# Patient Record
(demographics unavailable — no encounter records)

---

## 2020-08-10 NOTE — XMS REPORT
Continuity of Care Document

                             Created on: 08/10/2020



Farrukh Laguna

External Reference #: [id number]

: 2010

Sex: Male



Demographics





                          Address                   [address]

[place], [place]  [id number]

 

                          Home Phone                [phone] x

 

                          Preferred Language        Unknown

 

                          Marital Status            Unknown

 

                          Jewish Affiliation     Unknown

 

                          Race                      Unknown

 

                          Ethnic Group              Unknown





Author





                          Organization              Unknown

 

                          Address                   Unknown

 

                          Phone                     Unavailable



              



Allergies

      



             Active           Description           Code           Type         

  Severity   

                Reaction           Onset           Reported/Identified          

 

Relationship to Patient                 Clinical Status        

 

             Yes           No Known Allergies           [facility]           MED       

    N/A      

             N/A                        07/15/2019                              

   



                  



Medications

      



                Medication           Packaging           Start Date           St

op Date         

                    Route               Dosage              Sig        

 

                                      Montelukast Sodium 4 MG Oral Tablet Chewab

le                     

Tablet Chewable           2014                               Tablet Chewab

le      

                          30                                    CHEW AND SWALLOW

 1 TABLET DAILY AT BEDTIME         

         

 

                                                    Ventolin  (90 Base) M

CG/ACT Inhalation Aerosol Solution      

              Unit           2015                        Unit           8 

 

                                                    INHALE 2 PUFFS (60 SEC APART

) AS NEEDED FOR COUGH OR WHEEZE.

                  

 

                                ATROPINE SULFATE                                

         2016         

                                                                            Appl

y one drop to the right eye

every morning                  

 

                                Vyvanse 20 MG Oral Capsule                     U

D                  

07/15/2019           08/15/2019           ORAL            20MG                  

  

   Take one capsule in the am daily for ADHD                  

 

                                                    guanFACINE HCl ER 2 MG Oral 

Tablet Extended Release 24 Hour         

                UD              07/15/2019           08/15/2019           ORAL  

        

                          2MG                                   TAKE 1 TABLET EV

YISEL MORNING.                  

 

                                ARIPiprazole 2 MG Oral Tablet                   

  UD                  

2019           ORAL            2MG                   

   

  take one tab am daily                  

 

                                ARIPiprazole 2 MG Oral Tablet                   

  UD                  

09/10/2019           12/10/2019           ORAL            2MG                   

   

  take one tab am daily                  

 

                                ARIPiprazole 2 MG Oral Tablet                   

  UD                  

2019           ORAL            2MG                   

   

  take 1 tab am daily and 1/2 tab after school                  

 

                                ARIPiprazole(Abilify)                           

              2019    

                                Oral                                        Oral

, Daily, 0 

Refill(s)                  



                                  



Problems

      



             Date Dx Coded           Attending           Type           Code    

       

Diagnosis                               Diagnosed By        

 

             2016                        W            H26.042           An

terior 

subcapsular polar infantile and juvenile cataract, left eye                    

 

             2016                        W            H50.05           Alt

ernating 

esotropia                                        

 

             2016                        W            H53.032           St

rabismic 

amblyopia, left eye                              

 

             2016                        W            H26.042           An

terior 

subcapsular polar infantile and juvenile cataract, left eye                    

 

             2016                        W            H50.05           Alt

ernating 

esotropia                                        

 

             2016                        W            H53.032           St

rabismic 

amblyopia, left eye                              

 

             2017                        W            H52.03           Hyp

ermetropia, 

bilateral                                        

 

             2017                        W            H53.032           St

rabismic 

amblyopia, left eye                              

 

                2017           ALIN N.P., PATRICIA CRAVEN     

          V20.2     

                                                             

 

                2017           ALIN N.P., PATRICIA CRAVEN     

          V07.31    

                                                             

 

                2017           ALIN N.P., PATRICIA CRAVEN     

          493.00    

                                                             

 

             2018           [employer]            V20.2    

       Well 

child exam                                       

 

             2018                        W            H52.03           Hyp

ermetropia, 

bilateral                                        

 

             2018                        W            H53.032           St

rabismic 

amblyopia, left eye                              

 

             2018                        W            H52.03           Hyp

ermetropia, 

bilateral                                        

 

             2018                        W            H53.032           St

rabismic 

amblyopia, left eye                              

 

             2018                        W            H52.03           Hyp

ermetropia, 

bilateral                                        

 

             2018                        W            H53.032           St

rabismic 

amblyopia, left eye                              

 

             2018           [employer]            V20.2    

       Well 

child exam                                       

 

             2018           [employer]            312.9    

       

Unspecified conduct disorder                     

 

             2018           [employer]            298.9    

       

Affective mood disorder                          

 

             10/08/2018           [employer]            314.01   

        

Attention deficit hyperactivity disorder                    

 

             10/08/2018           [employer]            313.81   

        

Oppositional defiant disorder                    

 

             10/08/2018           [employer]            312.9    

       

Unspecified conduct disorder                     

 

             2018                        F            F91.3           Oppo

sitional 

defiant disorder                        Lata Aguila        

 

             2018                        F            F91.3           Oppo

sitional 

defiant disorder                        Lata Aguila        

 

             2018                        F            F91.3           Oppo

sitional 

defiant disorder                        Lindy Vega        

 

             2018                        F            F91.3           Oppo

sitional 

defiant disorder                        Anabel Sandrasulma Barcenas        

 

             2019                        F            F91.3           Oppo

sitional 

defiant disorder                        Lindy Vega        

 

             2019                        F            F91.3           Oppo

sitional 

defiant disorder                        Kusum, Kizzy        

 

             2019                        F            F91.3           Oppo

sitional 

defiant disorder                        Psy, Batch        

 

             2019                        F            F91.3           Oppo

sitional 

defiant disorder                        Vega, Lindy Sirisha        

 

             2019                        F            F91.3           Oppo

sitional 

defiant disorder                        Psy, Batch        

 

             2019                        F            F91.3           Oppo

sitional 

defiant disorder                        Psy, Batch        

 

             2019                        F            F91.3           Oppo

sitional 

defiant disorder                        Psy, Batch        

 

             2019                        F            F91.3           Oppo

sitional 

defiant disorder                        Psy, Batch        

 

             2019                        F            F91.3           Oppo

sitional 

defiant disorder                        Vega, Lindy Sirisha        

 

             2019                        F            F91.3           Oppo

sitional 

defiant disorder                        Psy, Batch        

 

             2019                        F            F91.3           Oppo

sitional 

defiant disorder                        Psy, Batch        

 

             2019                        F            F91.3           Oppo

sitional 

defiant disorder                        Psy, Batch        

 

             2019                        F            F91.3           Oppo

sitional 

defiant disorder                        Vega, Lindy Sirisha        

 

             2019                        F            F91.3           Oppo

sitional 

defiant disorder                        Psy, Batch        

 

             2019                        F            F91.3           Oppo

sitional 

defiant disorder                        Psy, Batch        

 

             2019                        F            F91.3           Oppo

sitional 

defiant disorder                        Psy, Batch        

 

             02/15/2019                        F            F91.3           Oppo

sitional 

defiant disorder                        Vega, Lindy Sirisha        

 

             02/15/2019                        F            F91.3           Oppo

sitional 

defiant disorder                        Psy, Batch        

 

             2019                        F            F91.3           Oppo

sitional 

defiant disorder                        Vega, Lindy Sirisha        

 

             2019                        F            F91.3           Oppo

sitional 

defiant disorder                        Vega, Lindy Sirisha        

 

             2019                        F            F91.3           Oppo

sitional 

defiant disorder                        Psy, Batch        

 

             2019                        F            F91.3           Oppo

sitional 

defiant disorder                        Psy, Batch        

 

             2019                        F            F91.3           Oppo

sitional 

defiant disorder                        Psy, Batch        

 

             2019                        F            F91.3           Oppo

sitional 

defiant disorder                        Vega, Lindy Sirisha        

 

             2019                        F            F91.3           Oppo

sitional 

defiant disorder                        Psy, Batch        

 

             2019                        F            F91.3           Oppo

sitional 

defiant disorder                        Psy, Batch        

 

             2019                        F            F91.3           Oppo

sitional 

defiant disorder                        Vega, Lindy Sirisha        

 

             2019                        F            F91.3           Oppo

sitional 

defiant disorder                        Psy, Batch        

 

             2019                        F            F91.3           Oppo

sitional 

defiant disorder                        Vega, Lindy Sirisha        

 

             2019                        F            F91.3           Oppo

sitional 

defiant disorder                        Psy, Batch        

 

             2019                        F            F91.3           Oppo

sitional 

defiant disorder                        Psy, Batch        

 

             2019                        F            F91.3           Oppo

sitional 

defiant disorder                        Psy, Batch        

 

             2019                        F            F91.3           Oppo

sitional 

defiant disorder                        Vega, Lindy Sirisha        

 

             03/15/2019                        F            F91.3           Oppo

sitional 

defiant disorder                        Psy, Batch        

 

             2019                        F            F91.3           Oppo

sitional 

defiant disorder                        Psy, Batch        

 

             2019                        F            F91.3           Oppo

sitional 

defiant disorder                        Psy, Batch        

 

             2019                        F            F91.3           Oppo

sitional 

defiant disorder                        Psy, Batch        

 

             2019                        F            F91.3           Oppo

sitional 

defiant disorder                        Psy, Batch        

 

             2019                        F            F91.3           Oppo

sitional 

defiant disorder                        Vega, Lindy Sirisha        

 

             2019                        F            F91.3           Oppo

sitional 

defiant disorder                        Psy, Batch        

 

             2019           [employer]., CHRISTIAN           F            F91

.9           

Conduct disorder, unspecified                    

 

             2019           [employer]A., CHRISTIAN           F            F90

.2           

Attention-deficit hyperactivity disorder, combined type                    

 

             2019                        F            F91.3           Oppo

sitional 

defiant disorder                        Psy, Batch        

 

             2019                        F            F91.3           Oppo

sitional 

defiant disorder                        Psy, Batch        

 

             2019                        F            F91.3           Oppo

sitional 

defiant disorder                        Vega, Lindy Sirisha        

 

             2019                        F            F91.3           Oppo

sitional 

defiant disorder                        Psy, Batch        

 

             2019                        F            F91.3           Oppo

sitional 

defiant disorder                        Psy, Batch        

 

             2019                        F            F91.3           Oppo

sitional 

defiant disorder                        Psy, Batch        

 

             2019                        F            F91.3           Oppo

sitional 

defiant disorder                        Vega, Lindy Sirisha        

 

             04/10/2019                        F            F91.3           Oppo

sitional 

defiant disorder                        Vega, Lindy Sirisha        

 

             04/10/2019                        F            F91.3           Oppo

sitional 

defiant disorder                        Psy, Batch        

 

             04/15/2019                        F            F91.3           Oppo

sitional 

defiant disorder                        Vega, Lindy Sirisha        

 

             2019                        F            F91.3           Oppo

sitional 

defiant disorder                        Psy, Batch        

 

             2019                        F            F91.3           Oppo

sitional 

defiant disorder                        Vega, Lindy Sirisha        

 

             2019                        F            F91.3           Oppo

sitional 

defiant disorder                        Psy, Batch        

 

             2019                        F            F91.3           Oppo

sitional 

defiant disorder                        Psy, Batch        

 

             2019                        F            F91.3           Oppo

sitional 

defiant disorder                        Psy, Batch        

 

             2019                        F            F91.3           Oppo

sitional 

defiant disorder                        Vega, Lindy Sirisha        

 

             2019                        F            F91.3           Oppo

sitional 

defiant disorder                        Psy, Batch        

 

             2019                        F            F91.3           Oppo

sitional 

defiant disorder                        Psy, Batch        

 

             2019                        F            F91.3           Oppo

sitional 

defiant disorder                        Psy, Batch        

 

             2019                        F            F91.3           Oppo

sitional 

defiant disorder                        Psy, Batch        

 

             2019                        F            F91.3           Oppo

sitional 

defiant disorder                        Vega, Lindy Sirisha        

 

             2019                        F            F91.3           Oppo

sitional 

defiant disorder                        Psy, Batch        

 

             2019                        F            F91.3           Oppo

sitional 

defiant disorder                        Psy, Batch        

 

             2019                        F            F91.3           Oppo

sitional 

defiant disorder                        Psy, Batch        

 

             2019                        F            F91.3           Oppo

sitional 

defiant disorder                        Vega, Lindy Sirisha        

 

             2019                        F            F91.3           Oppo

sitional 

defiant disorder                        Vega, Lindy Sirisha        

 

             2019                        F            F91.3           Oppo

sitional 

defiant disorder                        Psy, Batch        

 

             2019                        F            F91.3           Oppo

sitional 

defiant disorder                        Psy, Batch        

 

             2019                        F            F91.3           Oppo

sitional 

defiant disorder                        Vega, Lindy Sirisha        

 

             2019                        F            F91.3           Oppo

sitional 

defiant disorder                        Psy, Batch        

 

             2019                        F            F91.3           Oppo

sitional 

defiant disorder                        Psy, Batch        

 

             2019                        F            F91.3           Oppo

sitional 

defiant disorder                        Vega, Lindy Sirisha        

 

             05/15/2019                        F            F91.3           [facility]ppo

sitional 

defiant disorder                        Psy, Batch        

 

             05/15/2019                        F            F91.3           Oppo

sitional 

defiant disorder                        Psy, Batch        

 

             2019                        F            F91.3           Oppo

sitional 

defiant disorder                        Vega, Lindy Sirisha        

 

             2019                        F            F91.3           Oppo

sitional 

defiant disorder                        Psy, Batch        

 

             2019                        F            F91.3           Oppo

sitional 

defiant disorder                        Vega, Lindy Sirisha        

 

             2019                        F            F91.3           Oppo

sitional 

defiant disorder                        Vega, Lindy Sirisha        

 

             2019                        F            F91.3           Oppo

sitional 

defiant disorder                        Psy, Batch        

 

             2019                        F            F91.3           Oppo

sitional 

defiant disorder                        Psy, Batch        

 

             2019                        F            F91.3           Oppo

sitional 

defiant disorder                        Psy, Batch        

 

             2019                        F            F91.3           Oppo

sitional 

defiant disorder                        Psy, Batch        

 

             2019                        F            F91.3           Oppo

sitional 

defiant disorder                        Vega, Lindy Sirisha        

 

             2019                        F            F91.3           Oppo

sitional 

defiant disorder                        Psy, Batch        

 

             2019                        F            F91.3           Oppo

sitional 

defiant disorder                        Psy, Batch        

 

             2019                        F            F91.3           Oppo

sitional 

defiant disorder                        Psy, Batch        

 

             2019                        F            F91.3           Oppo

sitional 

defiant disorder                        Vega, Lindy Sirisha        

 

             2019                        F            F91.3           Oppo

sitional 

defiant disorder                        Psy, Batch        

 

             2019                        F            F91.3           Oppo

sitional 

defiant disorder                        Psy, Batch        

 

             2019                        F            F91.3           Oppo

sitional 

defiant disorder                        Psy, Batch        

 

             2019                        F            F91.3           Oppo

sitional 

defiant disorder                        Vega, Lindy Sirisha        

 

             2019                        F            F91.3           Oppo

sitional 

defiant disorder                        Psy, Batch        

 

             2019                        F            F91.3           Oppo

sitional 

defiant disorder                        Psy, Batch        

 

             2019                        F            F91.3           Oppo

sitional 

defiant disorder                        Psy, Batch        

 

             2019                        F            F91.3           Oppo

sitional 

defiant disorder                        Psy, Batch        

 

             2019                        F            F91.3           Oppo

sitional 

defiant disorder                        Vega, Lindy Sirisha        

 

             2019                        F            F91.3           Oppo

sitional 

defiant disorder                        Psy, Batch        

 

             2019                        F            F91.3           [facility]ppo

sitional 

defiant disorder                        Psy, Batch        

 

             2019                        F            F91.3           [facility]ppo

sitional 

defiant disorder                        Psy, Batch        

 

             2019                        F            F91.3           [facility]ppo

sitional 

defiant disorder                        Vega, Lindy Sirisha        

 

             2019                        F            F91.3           [facility]

sitional 

defiant disorder                        Psy, Batch        

 

             07/15/2019                        F            F90.1           Atte

ntion-deficit 

hyperactivity disorder, predominantly hyperactive type           Shira, 

Veronica        

 

             07/15/2019                        F            F91.3           [facility]

sitional 

defiant disorder                        Shira, Veronica        

 

             07/15/2019                        F            F91.3           [facility]

sitional 

defiant disorder                        Mckinley, Annel        

 

             07/15/2019                        F            F91.3           [facility]

sitional 

defiant disorder                        Psy, Batch        

 

             2019                        F            F90.1           Atte

ntion-deficit 

hyperactivity disorder, predominantly hyperactive type           Vega, Lindy

Sirisha        

 

             2019                        F            F91.3           [facility]

sitional 

defiant disorder                        Vega, Lindy Sirisha        

 

             2019                        F            F90.1           Atte

ntion-deficit 

hyperactivity disorder, predominantly hyperactive type           Vega, Lindy

Sirisha        

 

             2019                        F            F91.3           [facility]

sitional 

defiant disorder                        Vega, Lindy Sirisha        

 

             2019                        F            F91.3           [facility]

sitional 

defiant disorder                        Psy, Batch        

 

             2019                        F            F91.3           [facility]

sitional 

defiant disorder                        Psy, Batch        

 

             2019                        F            F90.1           Atte

ntion-deficit 

hyperactivity disorder, predominantly hyperactive type           Psy, Batch     

  

 

             2019                        F            F91.3           [facility]

sitional 

defiant disorder                        Psy, Batch        

 

             2019                        F            F90.1           Atte

ntion-deficit 

hyperactivity disorder, predominantly hyperactive type           Psy, Batch     

  

 

             2019                        F            F91.3           [facility]

sitional 

defiant disorder                        Psy, Batch        

 

             2019                        F            F90.1           Atte

ntion-deficit 

hyperactivity disorder, predominantly hyperactive type           Vega, Lindy

Sirisha        

 

             2019                        F            F91.3           [facility]

sitional 

defiant disorder                        Vega, Lindy Sirisha        

 

             2019                        F            F90.1           Atte

ntion-deficit 

hyperactivity disorder, predominantly hyperactive type           Psy, Batch     

  

 

             2019                        F            F91.3           [facility]

sitional 

defiant disorder                        Psy, Batch        

 

             2019                        F            F90.1           Atte

ntion-deficit 

hyperactivity disorder, predominantly hyperactive type           Psy, Batch     

  

 

             2019                        F            F91.3           [facility]

sitional 

defiant disorder                        Psy, Batch        

 

             2019                        F            F90.1           Atte

ntion-deficit 

hyperactivity disorder, predominantly hyperactive type           Vega, Lindy

Sirisha        

 

             2019                        F            F91.3           [facility]

sitional 

defiant disorder                        Vega, Lindy Sirisha        

 

             2019                        F            F90.1           Atte

ntion-deficit 

hyperactivity disorder, predominantly hyperactive type           Psy, Batch     

  

 

             2019                        F            F91.3           [facility]

sitional 

defiant disorder                        Psy, Batch        

 

             2019                        F            F90.1           Atte

ntion-deficit 

hyperactivity disorder, predominantly hyperactive type           Vega, Lindy

Sirisha        

 

             2019                        F            F91.3           [facility]

sitional 

defiant disorder                        Vega, Lindy Sirisha        

 

             2019                        F            F90.1           Atte

ntion-deficit 

hyperactivity disorder, predominantly hyperactive type           Psy, Batch     

  

 

             2019                        F            F91.3           [facility]

sitional 

defiant disorder                        Psy, Batch        

 

             2019                        F            F90.1           Atte

ntion-deficit 

hyperactivity disorder, predominantly hyperactive type           Psy, Batch     

  

 

             2019                        F            F91.3           [facility]

sitional 

defiant disorder                        Psy, Batch        

 

             2019                        F            F34.81           Dis

ruptive mood 

dysregulation disorder                  Shira, Veronica        

 

             2019                        F            F90.1           Atte

ntion-deficit 

hyperactivity disorder, predominantly hyperactive type           [place], 

Veronica        

 

             2019                        F            F91.3           [facility]

sitional 

defiant disorder                        Shira, Veronica        

 

             2019                        F            F91.3           [facility]

sitional 

defiant disorder                        Psy, Batch        

 

             2019                        F            F90.1           Atte

ntion-deficit 

hyperactivity disorder, predominantly hyperactive type           Psy, Batch     

  

 

             2019                        F            F91.3           [facility]

sitional 

defiant disorder                        Psy, Batch        

 

             2019                        F            F90.1           Atte

ntion-deficit 

hyperactivity disorder, predominantly hyperactive type           Psy, Batch     

  

 

             2019                        F            F91.3           [facility]

sitional 

defiant disorder                        Psy, Batch        

 

             2019                        F            F34.81           Dis

ruptive mood 

dysregulation disorder                  Psy, Batch        

 

             2019                        F            F90.1           Atte

ntion-deficit 

hyperactivity disorder, predominantly hyperactive type           Psy, Batch     

  

 

             2019                        F            F34.81           Dis

ruptive mood 

dysregulation disorder                  Psy, Batch        

 

             2019                        F            F90.1           Atte

ntion-deficit 

hyperactivity disorder, predominantly hyperactive type           Psy, Batch     

  

 

             2019                        F            F34.81           Dis

ruptive mood 

dysregulation disorder                  Psy, Batch        

 

             2019                        F            F90.1           Atte

ntion-deficit 

hyperactivity disorder, predominantly hyperactive type           Psy, Batch     

  

 

             2019                        F            F34.81           Dis

ruptive mood 

dysregulation disorder                  Psy, Batch        

 

             2019                        F            F90.1           Atte

ntion-deficit 

hyperactivity disorder, predominantly hyperactive type           Psy, Batch     

  

 

             2019                        F            F34.81           Dis

ruptive mood 

dysregulation disorder                  Psy, Batch        

 

             2019                        F            F90.1           Atte

ntion-deficit 

hyperactivity disorder, predominantly hyperactive type           Psy, Batch     

  

 

             09/10/2019                        F            F91.3           [facility]

sitional 

defiant disorder                        Shira, Veronica        

 

             09/10/2019                        F            F91.3           [facility]

sitional 

defiant disorder                        Psy, Batch        

 

             2019                        F            F34.81           Dis

ruptive mood 

dysregulation disorder                  Psy, Batch        

 

             2019                        F            F90.1           Atte

ntion-deficit 

hyperactivity disorder, predominantly hyperactive type           Psy, Batch     

  

 

             2019                        F            F34.81           Dis

ruptive mood 

dysregulation disorder                  Psy, Batch        

 

             2019                        F            F90.1           Atte

ntion-deficit 

hyperactivity disorder, predominantly hyperactive type           Psy, Batch     

  

 

             2019                        F            F34.81           Dis

ruptive mood 

dysregulation disorder                  McClish, Kaylyn M        

 

             2019                        F            F90.1           Atte

ntion-deficit 

hyperactivity disorder, predominantly hyperactive type           Kaylyn Griffith        

 

             2019                        F            F34.81           Dis

ruptive mood 

dysregulation disorder                  Psy, Batch        

 

             2019                        F            F90.1           Atte

ntion-deficit 

hyperactivity disorder, predominantly hyperactive type           Psy, Batch     

  

 

             2019                        F            F34.81           Dis

ruptive mood 

dysregulation disorder                  Kaylyn Griffith        

 

             2019                        F            F90.1           Atte

ntion-deficit 

hyperactivity disorder, predominantly hyperactive type           Kaylyn Griffith        

 

             2019                        F            F34.81           Dis

ruptive mood 

dysregulation disorder                  Psy, Batch        

 

             2019                        F            F90.1           Atte

ntion-deficit 

hyperactivity disorder, predominantly hyperactive type           Psy, Batch     

  

 

             2019                        F            F34.81           Dis

ruptive mood 

dysregulation disorder                  Psy, Batch        

 

             2019                        F            F90.1           Atte

ntion-deficit 

hyperactivity disorder, predominantly hyperactive type           Psy, Batch     

  

 

             2019                        F            F34.81           Dis

ruptive mood 

dysregulation disorder                  Psy, Batch        

 

             2019                        F            F90.1           Atte

ntion-deficit 

hyperactivity disorder, predominantly hyperactive type           Psy, Batch     

  

 

                2019           CHRISTIAN HANNAH            

   Z00.129          

                                        Encounter for routine child health exami

[facility] without abnormal findings        

                                                 

 

             2019                        F            F34.81           Dis

ruptive mood 

dysregulation disorder                  Kaylyn Griffith        

 

             2019                        F            F90.1           Atte

ntion-deficit 

hyperactivity disorder, predominantly hyperactive type           Kaylyn Griffith        

 

             2019                        F            F34.81           Dis

ruptive mood 

dysregulation disorder                  Psy, Batch        

 

             2019                        F            F90.1           Atte

ntion-deficit 

hyperactivity disorder, predominantly hyperactive type           Psy, Batch     

  

 

             10/02/2019                        F            F34.81           Dis

ruptive mood 

dysregulation disorder                  Psy, Batch        

 

             10/02/2019                        F            F90.1           Atte

ntion-deficit 

hyperactivity disorder, predominantly hyperactive type           Psy, Batch     

  

 

             10/02/2019                        F            F34.81           Dis

ruptive mood 

dysregulation disorder                  Psy, Batch        

 

             10/02/2019                        F            F90.1           Atte

ntion-deficit 

hyperactivity disorder, predominantly hyperactive type           Psy, Batch     

  

 

             10/02/2019                        F            F34.81           Dis

ruptive mood 

dysregulation disorder                  Psy, Batch        

 

             10/02/2019                        F            F90.1           Atte

ntion-deficit 

hyperactivity disorder, predominantly hyperactive type           Psy, Batch     

  

 

             10/08/2019                        F            F34.81           Dis

ruptive mood 

dysregulation disorder                  Kaylyn Griffith        

 

             10/08/2019                        F            F90.1           Atte

ntion-deficit 

hyperactivity disorder, predominantly hyperactive type           Kaylyn Griffith

M        

 

             10/08/2019                        F            F34.81           Dis

ruptive mood 

dysregulation disorder                  Psy, Batch        

 

             10/08/2019                        F            F90.1           Atte

ntion-deficit 

hyperactivity disorder, predominantly hyperactive type           Psy, Batch     

  

 

             10/08/2019                        F            F34.81           Dis

ruptive mood 

dysregulation disorder                  Psy, Batch        

 

             10/08/2019                        F            F90.1           Atte

ntion-deficit 

hyperactivity disorder, predominantly hyperactive type           Psy, Batch     

  

 

             10/10/2019                        W            H52.03           Hyp

ermetropia, 

bilateral                                        

 

             10/10/2019                        W            H52.03           Hyp

ermetropia, 

bilateral                                        

 

             10/10/2019                        W            H53.032           St

rabismic 

amblyopia, left eye                              

 

             10/10/2019                        W            H52.03           Hyp

ermetropia, 

bilateral                                        

 

             10/10/2019                        W            H53.032           St

rabismic 

amblyopia, left eye                              

 

             10/11/2019                        F            F34.81           Dis

ruptive mood 

dysregulation disorder                  Psy, Batch        

 

             10/11/2019                        F            F90.1           Atte

ntion-deficit 

hyperactivity disorder, predominantly hyperactive type           Psy, Batch     

  

 

             10/14/2019                        F            F34.81           Dis

ruptive mood 

dysregulation disorder                  Kaylyn Griffith M        

 

             10/14/2019                        F            F90.1           Atte

ntion-deficit 

hyperactivity disorder, predominantly hyperactive type           Kaylyn Griffith

M        

 

             10/14/2019                        F            F34.81           Dis

ruptive mood 

dysregulation disorder                  Psy, Batch        

 

             10/14/2019                        F            F90.1           Atte

ntion-deficit 

hyperactivity disorder, predominantly hyperactive type           Psy, Batch     

  

 

             10/14/2019                        F            F34.81           Dis

ruptive mood 

dysregulation disorder                  Kaylyn Griffith M        

 

             10/14/2019                        F            F90.1           Atte

ntion-deficit 

hyperactivity disorder, predominantly hyperactive type           Kaylyn Griffith        

 

             10/14/2019                        F            F34.81           Dis

ruptive mood 

dysregulation disorder                  Psy, Batch        

 

             10/14/2019                        F            F90.1           Atte

ntion-deficit 

hyperactivity disorder, predominantly hyperactive type           Psy, Batch     

  

 

             10/22/2019                        F            F34.81           Dis

ruptive mood 

dysregulation disorder                  Psy, Batch        

 

             10/22/2019                        F            F90.1           Atte

ntion-deficit 

hyperactivity disorder, predominantly hyperactive type           Psy, Batch     

  

 

             10/22/2019                        F            F34.81           Dis

ruptive mood 

dysregulation disorder                  Psy, Batch        

 

             10/22/2019                        F            F90.1           Atte

ntion-deficit 

hyperactivity disorder, predominantly hyperactive type           Psy, Batch     

  

 

             10/25/2019                        F            F34.81           Dis

ruptive mood 

dysregulation disorder                  Kaylyn Griffith        

 

             10/25/2019                        F            F90.1           Atte

ntion-deficit 

hyperactivity disorder, predominantly hyperactive type           Kaylyn Griffith        

 

             10/28/2019                        F            F34.81           Dis

ruptive mood 

dysregulation disorder                  Arce, Patricia Loan        

 

             10/28/2019                        F            F90.1           Atte

ntion-deficit 

hyperactivity disorder, predominantly hyperactive type           Arce, Patricia

kerrie        

 

             10/31/2019                        F            F34.81           Dis

ruptive mood 

dysregulation disorder                  Psy, Batch        

 

             10/31/2019                        F            F90.1           Atte

ntion-deficit 

hyperactivity disorder, predominantly hyperactive type           Psy, Batch     

  

 

             10/31/2019                        F            F34.81           Dis

ruptive mood 

dysregulation disorder                  Psy, Batch        

 

             10/31/2019                        F            F90.1           Atte

ntion-deficit 

hyperactivity disorder, predominantly hyperactive type           Psy, Batch     

  

 

             2019                        F            F34.81           Dis

ruptive mood 

dysregulation disorder                  Kaylyn Griffith        

 

             2019                        F            F90.1           Atte

ntion-deficit 

hyperactivity disorder, predominantly hyperactive type           Kaylyn Griffith

M        

 

             2019                        F            F34.81           Dis

ruptive mood 

dysregulation disorder                  Psy, Batch        

 

             2019                        F            F90.1           Atte

ntion-deficit 

hyperactivity disorder, predominantly hyperactive type           Psy, Batch     

  

 

             2019                        F            F34.81           Dis

ruptive mood 

dysregulation disorder                  Sandra Little        

 

             2019                        F            F90.1           Atte

ntion-deficit 

hyperactivity disorder, predominantly hyperactive type           Sandra Little        

 

             2019                        F            F34.81           Dis

ruptive mood 

dysregulation disorder                  Psy, Batch        

 

             2019                        F            F90.1           Atte

ntion-deficit 

hyperactivity disorder, predominantly hyperactive type           Psy, Batch     

  

 

             2019                        F            F34.81           Dis

ruptive mood 

dysregulation disorder                  Psy, Batch        

 

             2019                        F            F90.1           Atte

ntion-deficit 

hyperactivity disorder, predominantly hyperactive type           Psy, Batch     

  

 

             2019                        F            F34.81           Dis

ruptive mood 

dysregulation disorder                  Kaylyn Griffith        

 

             2019                        F            F90.1           Atte

ntion-deficit 

hyperactivity disorder, predominantly hyperactive type           Kaylyn Griffith        

 

             2019                        F            F34.81           Dis

ruptive mood 

dysregulation disorder                  Sandra Little        

 

             2019                        F            F90.1           Atte

ntion-deficit 

hyperactivity disorder, predominantly hyperactive type           Sandra Little        

 

             2019                        F            F34.81           Dis

ruptive mood 

dysregulation disorder                  Psy, Batch        

 

             2019                        F            F90.1           Atte

ntion-deficit 

hyperactivity disorder, predominantly hyperactive type           Psy, Batch     

  

 

             2019                        F            F34.81           Dis

ruptive mood 

dysregulation disorder                  Psy, Batch        

 

             2019                        F            F90.1           Atte

ntion-deficit 

hyperactivity disorder, predominantly hyperactive type           Psy, Batch     

  

 

             2019                        F            F34.81           Dis

ruptive mood 

dysregulation disorder                  Psy, Batch        

 

             2019                        F            F90.1           Atte

ntion-deficit 

hyperactivity disorder, predominantly hyperactive type           Psy, Batch     

  

 

             2019                        F            F34.81           Dis

ruptive mood 

dysregulation disorder                  Sandra Little        

 

             2019                        F            F90.1           Atte

ntion-deficit 

hyperactivity disorder, predominantly hyperactive type           Sandra Little        

 

             2019                        F            F34.81           Dis

ruptive mood 

dysregulation disorder                  Psy, Batch        

 

             2019                        F            F90.1           Atte

ntion-deficit 

hyperactivity disorder, predominantly hyperactive type           Psy, Batch     

  

 

             2019                        F            F34.81           Dis

ruptive mood 

dysregulation disorder                  Psy, Batch        

 

             2019                        F            F90.1           Atte

ntion-deficit 

hyperactivity disorder, predominantly hyperactive type           Psy, Batch     

  

 

             2019                        F            F34.81           Dis

ruptive mood 

dysregulation disorder                  Psy, Batch        

 

             2019                        F            F90.1           Atte

ntion-deficit 

hyperactivity disorder, predominantly hyperactive type           Psy, Batch     

  

 

             2019                        F            F34.81           Dis

ruptive mood 

dysregulation disorder                  Psy, Batch        

 

             2019                        F            F90.1           Atte

ntion-deficit 

hyperactivity disorder, predominantly hyperactive type           Psy, Batch     

  

 

             2019                        F            F34.81           Dis

ruptive mood 

dysregulation disorder                  Psy, Batch        

 

             2019                        F            F90.1           Atte

ntion-deficit 

hyperactivity disorder, predominantly hyperactive type           Psy, Batch     

  

 

             2019                        F            F34.81           Dis

ruptive mood 

dysregulation disorder                  Psy, Batch        

 

             2019                        F            F90.1           Atte

ntion-deficit 

hyperactivity disorder, predominantly hyperactive type           Psy, Batch     

  

 

             2019                        F            F91.3           Oppo

sitional 

defiant disorder                        SpencerSherin        

 

             2019                        F            F34.81           Dis

ruptive mood 

dysregulation disorder                  Royal, Cher Suárez        

 

             2019                        F            F90.1           Atte

ntion-deficit 

hyperactivity disorder, predominantly hyperactive type           Royal, Cher Johnsonen        

 

             2019                        F            F91.3           Oppo

sitional 

defiant disorder                        Psy, Batch        

 

             2019                        F            F34.81           Dis

ruptive mood 

dysregulation disorder                  Psy, Batch        

 

             2019                        F            F90.1           Atte

ntion-deficit 

hyperactivity disorder, predominantly hyperactive type           Psy, Batch     

  

 

             12/10/2019                        F            F34.81           Dis

ruptive mood 

dysregulation disorder                  Psy, Batch        

 

             12/10/2019                        F            F90.1           Atte

ntion-deficit 

hyperactivity disorder, predominantly hyperactive type           Psy, Batch     

  

 

             12/10/2019                        F            F34.81           Dis

ruptive mood 

dysregulation disorder                  Psy, Batch        

 

             12/10/2019                        F            F90.1           Atte

ntion-deficit 

hyperactivity disorder, predominantly hyperactive type           Psy, Batch     

  

 

             2019                        F            F34.81           Dis

ruptive mood 

dysregulation disorder                  Psy, Batch        

 

             2019                        F            F90.1           Atte

ntion-deficit 

hyperactivity disorder, predominantly hyperactive type           Psy, Batch     

  

 

             2019                        F            F34.81           Dis

ruptive mood 

dysregulation disorder                  Psy, Batch        

 

             2019                        F            F90.1           Atte

ntion-deficit 

hyperactivity disorder, predominantly hyperactive type           Psy, Batch     

  

 

             2019                        F            F34.81           Dis

ruptive mood 

dysregulation disorder                  Royal, Cher Johnsonen        

 

             2019                        F            F90.1           Atte

ntion-deficit 

hyperactivity disorder, predominantly hyperactive type           Royal, Cher Olguinyden        

 

             2019                        F            F34.81           Dis

ruptive mood 

dysregulation disorder                  Psy, Batch        

 

             2019                        F            F90.1           Atte

ntion-deficit 

hyperactivity disorder, predominantly hyperactive type           Psy, Batch     

  

 

             2019           Amadorey,,  Bridget           Reason           R56

.9           

Unspecified convulsions                          

 

             2019                        F            F34.81           Dis

ruptive mood 

dysregulation disorder                  Psy, Batch        

 

             2019                        F            F90.1           Atte

ntion-deficit 

hyperactivity disorder, predominantly hyperactive type           Psy, Batch     

  

 

             2019                        F            F34.81           Dis

ruptive mood 

dysregulation disorder                  Psy, Batch        

 

             2019                        F            F90.1           Atte

ntion-deficit 

hyperactivity disorder, predominantly hyperactive type           Psy, Batch     

  

 

             2019                        F            F34.81           Dis

ruptive mood 

dysregulation disorder                  Psy, Batch        

 

             2019                        F            F90.1           Atte

ntion-deficit 

hyperactivity disorder, predominantly hyperactive type           Psy, Batch     

  

 

             2020                        F            F34.81           Dis

ruptive mood 

dysregulation disorder                  Harbes, Aenea        

 

             2020                        F            F90.1           Atte

ntion-deficit 

hyperactivity disorder, predominantly hyperactive type           Harbes, Aenea  

     

 

             2020                        F            F34.81           Dis

ruptive mood 

dysregulation disorder                  Psy, Batch        

 

             2020                        F            F90.1           Atte

ntion-deficit 

hyperactivity disorder, predominantly hyperactive type           Psy, Batch     

  

 

             2020                        F            F34.81           Dis

ruptive mood 

dysregulation disorder                  Psy, Batch        

 

             2020                        F            F90.1           Atte

ntion-deficit 

hyperactivity disorder, predominantly hyperactive type           Psy, Batch     

  

 

             2020                        F            F34.81           Dis

ruptive mood 

dysregulation disorder                  Psy, Batch        

 

             2020                        F            F90.1           Atte

ntion-deficit 

hyperactivity disorder, predominantly hyperactive type           Psy, Batch     

  

 

             2020                        F            F34.81           Dis

ruptive mood 

dysregulation disorder                  Psy, Batch        

 

             2020                        F            F90.1           Atte

ntion-deficit 

hyperactivity disorder, predominantly hyperactive type           Psy, Batch     

  



                                                                                
                                                                                
                                                                                
                                                                                
                                                                                
                                                                                
                                                                                
                                                                     



Procedures

      



                Code            Description           Performed By           Per

opal On        

 

                                      [id number]                                 OFFI

CE/OUTPATIENT VISIT, EST  

                                                    2016        

 

                                                                       Visi

on svcs frames purchases  

                                                    2016        

 

                                                                       Lens

 spher single plano 4.00  

                                                    2016        

 

                                                                       Sphe

rocylindr 4.00d/12-2.00d  

                                                    2016        

 

                                                                       Lens

, 1.54-1.65 p/1.60-1.79g  

                                                    2016        

 

                                      [id number]                                 EYE 

EXAM T TREATMENT          

                                                    2017        

 

                                                                       Visi

on svcs frames purchases  

                                                    2017        

 

                                                                       Lens

 spher single plano 4.00  

                                                    2017        

 

                                                                       Sphe

rocylindr 4.00d/12-2.00d  

                                                    2017        

 

                                                                       Lens

, 1.54-1.65 p/1.60-1.79g  

                                                    2017        

 

                                [id number]                                           

                    

                                        2017        

 

                                [id number]                                           

                    

                                        2017        

 

                                [id number]                                           

                    

                                        2017        

 

                                                                       Prot

ection Plan Level 1       

                                                    2017        

 

                                                                       Visi

on svcs frames purchases  

                                                    2017        

 

                                                                       Lens

 spher single plano 4.00  

                                                    2017        

 

                                                                       Sphe

rocylindr 4.00d/12-2.00d  

                                                    2017        

 

                                                                       Lens

, 1.54-1.65 p/1.60-1.79g  

                                                    2017        

 

                                                                       Prot

ection Plan Level 1       

                                                    2017        

 

                                                                       Visi

on svcs frames purchases  

                                                    2017        

 

                                                                       Lens

 spher single plano 4.00  

                                                    2017        

 

                                                                       Sphe

rocylindr 4.00d/12-2.00d  

                                                    2017        

 

                                                                       Lens

, 1.54-1.65 p/1.60-1.79g  

                                                    2017        

 

                                                                       Visi

on svcs frames purchases  

                                                    2018        

 

                                                                       Lens

 spher single plano 4.00  

                                                    2018        

 

                                                                       Sphe

rocylindr 4.00d/12-2.00d  

                                                    2018        

 

                                                                       Lens

, 1.54-1.65 p/1.60-1.79g  

                                                    2018        

 

                                      67884                                 Scre

ening visual acuity, 

quantitative, bilateral                                               2018

        

 

                                      49090                                 Pure

 tone audiometry 

(threshold); air only                                               2018  

      

 

                                      03986                                 Prev

entive medicine, 

established patient, age 5-11 years                                             

  2018  

     

 

                                      26514                                 EYE 

EXAM T TREATMENT          

                                                    2018        

 

                                      [id number]                                 REFR

ACTION                    

                                                    2018        

 

                                                                       BTS 

Free PP Promo             

                                                    2018        

 

                                                                       Visi

on svcs frames purchases  

                                                    2018        

 

                                                                       Lens

 spher single plano 4.00  

                                                    2018        

 

                                                                       Lens

, 1.54-1.65 p/1.60-1.79g  

                                                    2018        

 

                                      25733                                 Scre

ening visual acuity, 

quantitative, bilateral                                               2018

        

 

                                      79744                                 Pure

 tone audiometry 

(threshold); air only                                               2018  

      

 

                                      10623                                 Prev

entive medicine, 

established patient, age 5-11 years                                             

  2018  

     

 

                                      [id number]                                 Psy

chiatrist Referral        

                                                    10/08/2018        

 

                                      [id number]                                 Offi

ce/outpatient visit; 

established patient, level 4 (28 minutes)                                       

        

10/08/2018        

 

                                      [id number]                                 Admi

ssion Intake              

                          Lata Aguila           2018        

 

                                      51004                                 Admi

ssion Intake              

                          Mingo Lata Davalos           2018        

 

                                                                       BTS 

Free PP Promo             

                                                    2018        

 

                                                                       Visi

on svcs frames purchases  

                                                    2018        

 

                                                                       Lens

 spher single plano 4.00  

                                                    2018        

 

                                                                       Lens

, 1.54-1.65 p/1.60-1.79g  

                                                    2018        

 

                                                                       CPST

 - Child                  

                          Silvia, Lindy Grimm           2018        

 

                                                                       D[facility] Case Management      

                          Staff, [facility]           2019        

 

                                                                       [facility]

 - Child                  

                          VegaLindy           2019        

 

                                                                       [facility]ST

 - Child                  

                          Vega, Lindy Grimm           2019        

 

                                                                       Psyc

hosocial Rehabiliation - 

Individual                     Staff, [facility]           2019      

 

 

                                                                       Psyc

hosocial Rehabilitation - 

Child                     Staff, [facility]           2019        

 

                                                                       Psyc

hosocial Rehabilitation - 

Child                     Staff, [facility]           2019        

 

                                                                       Psyc

hosocial Rehabilitation - 

Child                     Staff, [facility]           2019        

 

                                                                       [facility]

 - Child                  

                          Vega, Lindy Grimm           2019        

 

                                                                       [facility]ST

 - Child                  

                          Vega, Lindy Grimm           2019        

 

                                                                       Psyc

hosocial Rehabilitation - 

Child                     Staff, [facility]           2019        

 

                                                                       Targ

eted Case Management      

                          Vega, Lindy Sirisha           2019        

 

                                                                       Targ

eted Case Management      

                          Vega, Lindy Sirisha           2019        

 

                                                                       Psyc

hosocial Rehabilitation - 

Child                     Staff, [facility]           2019        

 

                                                                       Targ

eted Case Management      

                          Staff, [facility]           2019        

 

                                                                       [facility]

 - Child                  

                          Vega, Lindy Sirisha           2019        

 

                                                                       [facility]ST

 - Child                  

                          Vega, Lindy Sirisha           2019        

 

                                                                       Psyc

hosocial Rehabilitation - 

Child                     Staff, [facility]           2019        

 

                                                                       [facility]

 - Child                  

                          Vega, Lindy Sirisha           2019        

 

                                                                       [facility]ST

 - Child                  

                          Vega, Lindy Sirisha           2019        

 

                                                                       Psyc

hosocial Rehabilitation - 

Child                     Staff, [facility]           2019        

 

                                                                       Psyc

hosocial Rehabilitation - 

Child                     Staff, [facility]           02/15/2019        

 

                                                                       [facility]ST

 - Child                  

                          Vega, Lindy Sirisha           2019        

 

                                                                       [facility]ST

 - Child                  

                          Vega, Lindy Sirisha           2019        

 

                                                                       Psyc

hosocial Rehabilitation - 

Child                     Staff, [facility]           2019        

 

                                                                       [facility]

 - Child                  

                          Vega, Lindy Sirisha           2019        

 

                                                                       [facility]

 - Child                  

                          Vega, Lindy Sirisha           2019        

 

                                                                       Psyc

hosocial Rehabilitation - 

Child                     Staff, [facility]           2019        

 

                                                                       [facility]

 - Child                  

                          Vega, Lindy Sirisha           2019        

 

                                                                       [facility]ST

 - Child                  

                          Vega, Lindy Sirisha           2019        

 

                                                                       Psyc

hosocial Rehabilitation - 

Child                     Staff, [facility]           2019        

 

                                                                       Targ

eted Case Management      

                          Evga, Lindy Sirisha           2019        

 

                                                                       Targ

eted Case Management      

                          Vega, Lindy Sirisha           2019        

 

                                                                       Psyc

hosocial Rehabiliation - 

Individual                     Staff, [facility]           2019      

 

 

                                                                       Psyc

hosocial Rehabilitation - 

Child                     Staff, [facility]           2019        

 

                                                                       [facility]

 - Child                  

                          Vega, Lindy Sirisha           2019        

 

                                                                       [facility]ST

 - Child                  

                          Vega, Lindy Sirisha           2019        

 

                                                                       Psyc

hosocial Rehabilitation - 

Child                     Staff, [facility]           2019        

 

                                                                       Psyc

hosocial Rehabilitation - 

Child                     Staff, [facility]           03/15/2019        

 

                                                                       [facility]ST

 - Child                  

                          Vega, Lindy Sirisha           2019        

 

                                                                       [facility]ST

 - Child                  

                          Vega, Lindy Sirisha           2019        

 

                                                                       Psyc

hosocial Rehabiliation - 

Individual                     Staff, [facility]           2019      

 

 

                                      [id number]                                 Offi

ce/outpatient visit; 

established patient, level 3                                                       

 

                                                                       CPST

 - Child                  

                          Vega, Lindy Sirisha           2019        

 

                                                                       [facility]ST

 - Child                  

                          Vega, Lindy Sirisha           2019        

 

                                                                       Psyc

hosocial Rehabilitation - 

Child                     Staff, [facility]           2019        

 

                                                                       Psyc

hosocial Rehabilitation - 

Child                     Staff, [facility]           2019        

 

                                                                       [facility]

 - Child                  

                          Vega, Lindy Sirisha           2019        

 

                                                                       CPST

 - Child                  

                          Vega, Lindy Sirisha           2019        

 

                                                                       CPST

 - Child                  

                          Vega, Lindy Sirisha           2019        

 

                                                                       CPST

 - Child                  

                          Vega, Lindy Sirisha           2019        

 

                                                                       Psyc

hosocial Rehabilitation - 

Child                     Staff, [facility]           2019        

 

                                                                       CPST

 - Child                  

                          Vega, Lindy Sirisha           04/10/2019        

 

                                                                       Psyc

hosocial Rehabilitation - 

Child                     Staff, [facility]           2019        

 

                                                                       [facility]

[employer] Case [employer]      

                          Vega, Lindy Sirisha           04/15/2019        

 

                                                                       T[facility]

eted Case [employer]      

                          Vega, Lindy Sirisha           04/15/2019        

 

                                                                       Psyc

hosocial Rehabilitation - 

Child                     Staff, [facility]           2019        

 

                                                                       CPST

 - Child                  

                          Vega, Lindy Sirisha           2019        

 

                                                                       CPST

 - Child                  

                          Vega, Lindy Sirisha           2019        

 

                                                                       Psyc

hosocial Rehabiliation - 

Individual                     Staff, [facility]           2019      

 

 

                                                                       Psyc

hosocial Rehabilitation - 

Child                     Staff, [facility]           2019        

 

                                                                       Psyc

hosocial Rehabilitation - 

Child                     Staff, [facility]           2019        

 

                                                                       CPST

 - Child                  

                          Vega, Lindy Sirisha           2019        

 

                                                                       CPST

 - Child                  

                          Vega, Lindy Sirisha           2019        

 

                                                                       Psyc

hosocial Rehabilitation - 

Child                     Staff, [facility]           2019        

 

                                                                       Psyc

hosocial Rehabiliation - 

Individual                     Staff, [facility]           2019      

 

 

                                                                       Psyc

hosocial Rehabilitation - 

Child                     Staff, [facility]           2019        

 

                                                                       CPST

 - Child                  

                          Vega, Lindy Sirisha           2019        

 

                                                                       CPST

 - Child                  

                          Vega, Lindy Sirisha           2019        

 

                                                                       Atte

ndant Care                

                          Vega, Lindy Sirisha           2019        

 

                                                                       Atte

ndant Care                

                          Vega, Lindy Sirisha           2019        

 

                                                                       Psyc

hosocial Rehabiliation - 

Individual                     Staff, [facility]           2019      

 

 

                                                                       [facility]

[employer] Case [employer]      

                          Vega, Lindy Sirisha           2019        

 

                                                                       [facility]

[employer] Case [employer]      

                          Vega, Lindy Sirisha           2019        

 

                                                                       CPST

 - Child                  

                          Vega, Lindy Grimm           2019        

 

                                                                       Psyc

hosocial Rehabiliation - 

Individual                     Staff, [facility]           2019      

 

 

                                                                       CPST

 - Child                  

                          Vega, Lindy Sirisha           2019        

 

                                                                       CPST

 - Child                  

                          Vega, Lindy Sirisha           2019        

 

                                                                       CPST

 - Child                  

                          Vega, Lindy Sirisha           2019        

 

                                                                       CPST

 - Child                  

                          Vega, Lindy Sirisha           2019        

 

                                                                       Psyc

hosocial Rehabilitation - 

Child                     Staff, [facility]           2019        

 

                                                                       Visi

on svcs frames purchases  

                                                    2019        

 

                                                                       Lens

 spher single plano 4.00  

                                                    2019        

 

                                                                       Lens

, 1.54-1.65 p/1.60-1.79g  

                                                    2019        

 

                                                                       Psyc

hosocial Rehabilitation - 

Child                     Staff, [facility]           2019        

 

                                                                       [facility]

[employer] Case [employer]      

                          Vega, Lindy Sirisha           2019        

 

                                                                       [facility]

eted Case Management      

                          Vega, Lindy Sirisha           2019        

 

                                                                       CPST

 - Child                  

                          Vega, Lindy Sirisha           2019        

 

                                                                       CPST

 - Child                  

                          Vega, Lindy Grimm           2019        

 

                                                                       CPST

 - Child                  

                          Vega, Lindy Sirisha           2019        

 

                                                                       CPST

 - Child                  

                          Vega, Lindy Sirisha           2019        

 

                                                                       Psyc

hosocial Rehabilitation - 

Child                     Staff, [facility]           2019        

 

                                                                       Psyc

hosocial Rehabilitation - 

Child                     Staff, [facility]           2019        

 

                                                                       CPST

 - Child                  

                          Vega, Lindy Sirisha           2019        

 

                                                                       CPST

 - Child                  

                          Vega, Lindy Sirisha           2019        

 

                                                                       Psyc

hosocial Rehabilitation - 

Child                     Staff, [facility]           2019        

 

                                                                       Psyc

hosocial Rehabiliation - 

Individual                     Staff, [facility]           06/15/2019      

 

 

                                                                       Psyc

hosocial Rehabilitation - 

Child                     Staff, [facility]           2019        

 

                                                                       CPST

 - Child                  

                          Vega, Lindy Sirisha           2019        

 

                                                                       CPST

 - Child                  

                          Vega, Lindy Sirisha           2019        

 

                                                                       Psyc

hosocial Rehabilitation - 

Child                     Staff, [facility]           2019        

 

                                                                       Psyc

hosocial Rehabiliation - 

Individual                     Staff, [facility]           2019      

 

 

                                                                       Psyc

hosocial Rehabilitation - 

Child                     Staff, [facility]           2019        

 

                                                                       CPST

 - Child                  

                          Vega, Lindy Sirisha           2019        

 

                                                                       CPST

 - Child                  

                          Vega, Lindy Sirisha           2019        

 

                                                                       Psyc

hosocial Rehabilitation - 

Child                     Staff, [facility]           2019        

 

                                                                       CPST

 - Child                  

                          Vega, Lindy Sirisha           07/10/2019        

 

                                                                       CPST

 - Child                  

                          Vega, Lindy Sirisha           07/10/2019        

 

                                                                       Psyc

hosocial Rehabilitation - 

Child                     Staff, [facility]           07/10/2019        

 

                                                                       Psyc

hosocial Rehabilitation - 

Child                     Staff, [facility]           2019        

 

                                      [id number]                                 OFFI

CE/OUTPATIENT VISIT, MARY JO Silva, Veronica           07/15/2019        

 

                                      [id number]                                 OFFI

CE/OUTPATIENT VISIT, MARY JO Silva, Veronica           07/15/2019        

 

                                                                       [facility]

[employer] Case [employer]      

                          Vega, Lindy Sirisha           07/15/2019        

 

                                                                       [facility]

eted Case Management      

                          Vega, Lindy Sirisha           07/15/2019        

 

                                                                       CPST

 - Child                  

                          Vega, Lindy Sirisha           2019        

 

                                                                       CPST

 - Child                  

                          Vega, Lindy Sirisha           2019        

 

                                                                       Psyc

hosocial Rehabilitation - 

Child                     Staff, [facility]           2019        

 

                                                                       CPST

 - Child                  

                          Vega, Lindy Sirisha           2019        

 

                                                                       CPST

 - Child                  

                          Vega, Lindy Sirisha           2019        

 

                                                                       CPST

 - Child                  

                          Vega, Lindy Sirisha           2019        

 

                                                                       CPST

 - Child                  

                          Vega, Lindy Sirisha           2019        

 

                                                                       Psyc

hosocial Rehabiliation - 

Individual                     Staff, [facility]           2019      

 

 

                                                                       CPST

 - Child                  

                          Vega, Lindy Sirisha           2019        

 

                                                                       CPST

 - Child                  

                          Vega, Lindy Sirisha           2019        

 

                                                                       Psyc

hosocial Rehabiliation - 

Individual                     Staff, [facility]           2019      

 

 

                                                                       Psyc

hosocial Rehabilitation - 

Child                     Staff, [facility]           2019        

 

                                      [id number]                                 OFFI

CE/OUTPATIENT VISIT, MARY JO Silva, Veronica           2019        

 

                                      [id number]                                 OFFI

CE/OUTPATIENT VISIT, MARY JO Silva, Veronica           2019        

 

                                                                       Psyc

hosocial Rehabilitation - 

Child                     Staff, [facility]           2019        

 

                                                                       Psyc

hosocial Rehabiliation - 

Individual                     Staff, [facility]           2019      

 

 

                                                                       Psyc

hosocial Rehabilitation - 

Child                     Staff, [facility]           2019        

 

                                                                       Psyc

hosocial Rehabiliation - 

Individual                     Staff, [facility]           2019      

 

 

                                                                       Psyc

hosocial Rehabilitation - 

Child                     Staff, [facility]           2019        

 

                                                                       Psyc

hosocial Rehabilitation - 

Child                     Staff, [facility]           2019        

 

                                                                       Psyc

hosocial Rehabilitation - 

Child                     Staff, [facility]           2019        

 

                                      [id number]                                 OFFI

CE/OUTPATIENT VISIT, MARY JO Silva, Veronica           09/10/2019        

 

                                      [id number]                                 OFFI

CE/OUTPATIENT VISIT, MARY JO Silva, Veronica           09/10/2019        

 

                                                                       CPST

 - Child                  

                          Kaylyn Griffith           2019        

 

                                                                       CPST

 - Child                  

                          Kaylyn Griffith           2019        

 

                                                                       Psyc

hosocial Rehabiliation - 

Individual                     Staff, [facility]           2019      

 

 

                                                                       Psyc

hosocial Rehabilitation - 

Child                     Staff, [facility]           2019        

 

                                                                       CPST

 - Child                  

                          Kaylyn Griffith M           2019        

 

                                                                       CPST

 - Child                  

                          Kaylyn Griffith M           2019        

 

                                                                       Ps

hosocial Rehabilitation - 

Child                     Staff, [facility]           2019        

 

                                      [id number]                                 Scre

ening visual acuity, 

quantitative, bilateral                                               2019

        

 

                                      56475                                 Pure

 tone audiometry 

(threshold); air only                                               2019  

      

 

                                      [id number]                                 Prev

entive medicine, 

established patient, age 5-11 years                                             

  2019  

     

 

                                                                       [facility] - 

Child                     Staff, [facility]           2019        

 

                                                                       CPST

 - Child                  

                          Kaylyn Griffith M           2019        

 

                                                                       CPST

 - Child                  

                          Kaylyn Griffith M           2019        

 

                                                                       [facility]

[employer] Case Management      

                          Kaylyn Griffith M           10/02/2019        

 

                                                                       [facility]

eted Case Management      

                          Kaylyn Griffith M           10/02/2019        

 

                                                                       [facility] Re[facility] - 

Individual                     Staff, [facility]           10/02/2019      

 

 

                                                                       [facility] 

Child                     Staff, [facility]           10/03/2019        

 

                                                                       Atte

ndant Care                

                          Kaylyn Griffith M           10/04/2019        

 

                                                                       Atte

ndant Care                

                          Kaylyn Griffith M           10/04/2019        

 

                                                                       CPST

 - Child                  

                          Kaylyn Griffith M           10/08/2019        

 

                                                                       CPST

 - Kaylyn Lynn           10/08/2019        

 

                                      [id number]                                 EYE 

EXAM T TREATMENT          

                                                    10/10/2019        

 

                                                                       Visi

on svcs frames purchases  

                                                    10/10/2019        

 

                                                                       Lens

 spher single plano 4.00  

                                                    10/10/2019        

 

                                                                       Lens

, 1.54-1.65 p/1.60-1.79g  

                                                    10/10/2019        

 

                                                                       [facility]

[employer] Case Management      

                          Kaylyn Griffith M           10/14/2019        

 

                                                                       [facility]

eted Case Management      

                          Kaylyn Griffith M           10/14/2019        

 

                                                                       CPST

 - Child                  

                          Kaylyn Griffith M           10/14/2019        

 

                                                                       CPST

 - Child                  

                          Kaylyn Griffith           10/14/2019        

 

                                                                       Psyc

hosocial Rehabiliation - 

Individual                     Staff, [facility]           10/17/2019      

 

 

                                                                       Psyc

hosocial Rehabilitation - 

Child                     Staff, [facility]           10/18/2019        

 

                                                                       Psyc

hosocial Rehabilitation - 

Child                     Staff, [facility]           10/21/2019        

 

                                                                       Psyc

hosocial Rehabilitation - 

Child                     Staff, [facility]           10/25/2019        

 

                                                                       Atte

ndant Kaylyn To           10/25/2019        

 

                                                                       Atte

ndant Care                

                          Kaylyn Griffith M           10/25/2019        

 

                                                                       Psyc

hosocial Rehabilitation - 

Child                     Staff, [facility]           10/29/2019        

 

                                                                       Atte

ndant Care                

                          Kaylyn Griffith M           2019        

 

                                                                       CPST

 - Child                  

                          Kaylyn Griffith M           2019        

 

                                                                       Atte

ndant Care                

                          Kaylyn Griffith M           2019        

 

                                                                       CPST

 - Child                  

                          Kaylyn Griffith M           2019        

 

                                                                       Psyc

hosocial Rehabilitation - 

Child                     Staff, [facility]           2019        

 

                                                                       Psyc

hosocial Rehabilitation - 

Child                     Staff, [facility]           2019        

 

                                                                       Psyc

hosocial Rehabiliation - 

Individual                     Staff, [facility]           2019      

 

 

                                                                       Atte

ndant Care                

                          Kaylyn Griffith M           2019        

 

                                                                       Atte

ndant Care                

                          Kaylyn Griffith           2019        

 

                                                                       Psyc

hosocial Rehabilitation - 

Child                     Staff, [facility]           2019        

 

                                                                       Psyc

hosocial Rehabiliation - 

Individual                     Staff, [facility]           2019      

 

 

                                                                       Psyc

hosocial Rehabilitation - 

Child                     Staff, [facility]           2019        

 

                                                                       Atte

ndant Care                

                          Kaylyn Griffith M           2019        

 

                                                                       CPST

 - Child                  

                          Kaylyn Griffith M           2019        

 

                                                                       Psyc

hosocial Rehabiliation - 

Individual                     Staff, [facility]           2019      

 

 

                                                                       Psyc

hosocial Rehabilitation - 

Child                     Staff, [facility]           2019        

 

                                                                       Psyc

hosocial Rehabilitation - 

Child                     Staff, [facility]           2019        

 

                                                                       Targ

eted Case Management      

                          Cher Paige           2019        

 

                                                                       Targ

eted Case Management      

                          Cher Paige           2019        

 

                                                                       Psyc

hosocial Rehabilitation - 

Child                     Staff, [facility]           2019        

 

                                                                       Psyc

hosocial Rehabiliation - 

Individual                     Staff, [facility]           2019      

 

 

                                                                       CPST

 - Child                  

                          Royal, Cher Johnsonen           2019        

 

                                                                       CPST

 - Child                  

                          Royal, Cher Johnsonen           2019        

 

                                      [id number]                                 OFFI

CE/OUTPATIENT VISIT, MARY JO Silva, Veronica           2019        

 

                                      [id number]                                 OFFI

CE/OUTPATIENT VISIT, MARY JO Silva, Veronica           2019        

 

                                                                       Psyc

hosocial Rehabilitation - 

Child                     Staff, [facility]           12/10/2019        

 

                                                                       Psyc

hosocial Rehabilitation - 

Child                     Staff, [facility]           2019        

 

                                                                       Psyc

hosocial Rehabilitation - 

Child                     Staff, [facility]           2019        

 

                                                                       CPST

 - Child                  

                          Royal, Cher Johnsonen           2019        

 

                                                                       CPST

 - Child                  

                          Royal, Cher Jose Armando           2019        

 

                                                                       Psyc

hosocial Rehabilitation - 

Child                     Staff, [facility]           2019        

 

                                                                       CPST

 - Child                  

                          Royal, Cher Johnsonen           2019        

 

                                                                       Psyc

hosocial Rehabiliation - 

Individual                     Staff, [facility]           2019      

 

 

                                                                       CPST

 - Child                  

                          Royal, Cher Johnsonen           2019        

 

                                                                       Psyc

hosocial Rehabilitation - 

Child                     Staff, [facility]           2019        



                                                                                
                                                                                
                                                                                
                                                                                
                                                                                
                                                                                
                                        



Results

      



                    Test                Result              Range        

 

                                        Lipid Panel - 19 09:49         

 

                    Cholesterol           146 mg/dL           0-169        

 

                    Cardiac Risk           2.2                 0.0-5.7        

 

                    HDL Cholesterol           65 mg/dL            40-84        

 

                    LDL Cholesterol           76 mg/dL            0-130        

 

                    Triglycerides           25 mg/dL            0-149        

 

                    VLDL Cholesterol           5 mg/dL             0-28        

 

                                        Non-HDL Cholesterol - 19 09:49    

     

 

                    Non-HDL Cholesterol           81 mg/dL            0-159     

   

 

                                        Hemoglobin A1C - 19 09:49         

 

                    Hemoglobin A1C           5.3 %               4.1-5.6        

 

                                        Estimated Average Glucose - 19 09:

49         

 

                    Estimated Average Glucose           105.4 mg/dL             

       

 

                                        Glucose  NPT - 19 18:26         

 

                    Glucose  NPT           94 mg/dL                    

 

                                        CBC With Platelet and Differential -  18:42         

 

                    Absolute Basophils           0.04 10*3/uL           0.00-0.2

0        

 

                    Absolute Eosinophils           0.18 10*3/uL           0.00-0

.60        

 

                    Absolute Lymphocytes           2.37 10*3/uL           1.50-6

.50        

 

                    Absolute Monocytes           0.45 10*3/uL           0.00-0.8

0        

 

                    Absolute Neutrophils           3.74 10*3/uL           1.80-8

.00        

 

                    Basophils           1 %                 0-2        

 

                    Eosinophils           3 %                 0-4        

 

                    HCT                 39.3 %              35.0-45.0        

 

                    HGB                 13.8 g/dL           11.5-15.5        

 

                    Immature Granulocytes           0.1 %               0.0-1.0 

       

 

                    Lymphocytes           35 %                35-54        

 

                    MCH                 29.0 pg             25.0-33.0        

 

                    MCHC                35.1 g/dL           31.0-37.0        

 

                    MCV                 82.6 fL             77.0-95.0        

 

                    Monocytes           7 %                 5-12        

 

                    MPV                 10.8 fL             9.4-12.3        

 

                    Neutrophils           55 %                25-78        

 

                    Nucleated RBC Automated           0.0 /100 WBC              

      

 

                    Platelet Count           215 K/uL            150-400        

 

                    RBC                 4.76 10*6/uL           4.00-5.20        

 

                    RDW                 12.6 %              11.5-14.5        

 

                    WBC                 6.8 K/uL            4.5-13.5        

 

                                        Comprehensive Metabolic Panel (CMP) -  18:48         

 

                    Albumin             4.1 g/dL            3.5-4.8        

 

                    Alkaline Phosphatase           153 U/L             117-390  

      

 

                    ALT (SGPT)           26 U/L              17-63        

 

                    Anion Gap           8 mEq/L             3-20        

 

                    AST (SGOT)           34 U/L              15-41        

 

                    Bilirubin Total           0.5 mg/dL           0.2-1.2       

 

 

                    BUN                 13 mg/dL            4-20        

 

                    Calcium             9.3 mg/dL           8.6-10.0        

 

                    Chloride            107 mEq/L                   

 

                    CO2                 22 mEq/L            22-32        

 

                    Creatinine           0.44 mg/dL           0.64-1.27        

 

                    Globulin            2.5 g/dL            1.9-4.3        

 

                    Glucose             97 mg/dL                    

 

                    Potassium           3.7 mEq/L           3.4-4.7        

 

                    Protein             6.6 g/dL            6.1-7.9        

 

                    Sodium              137 mEq/L           136-144        

 

                                        Alcohol, Blood - 19 18:48         

 

                    Alcohol, Blood           Not Detected mg/dL           None D

etected        

 

                                        Chem 8 NPT - 19 18:49         

 

                    Anion Gap           11 mEq/L            3-20        

 

                    BUN Venous           14 mg/dl            4-20        

 

                    Calcium Ionized Venous           1.17 mmol/L           1.19-

1.41        

 

                    Creatinine Venous           0.3 mg/dL           0.7-1.2     

   

 

                    Glucose Venous           97 mg/dL                    

 

                    Potassium, WB           3.8 mEq/L           3.4-4.7        

 

                    Sodium Venous           138 mEq/L           136-144        

 

                    Total CO2 Venous           22 mEq/L            25-29        

 

                    Venous CL           105 mEq/L                   

 

                    HCT Venous           41.0 %              35.0-45.0        

 

                    HGB Venous NPT           13.9 g/dL           11.5-15.5      

  

 

                                        Lactic Acid NPT - 19 18:55        

 

 

                    Lactic Acid NPT           0.8 mEq/L           0.5-2.0       

 

 

                                        Urine Drug Screen - 19 19:00      

   

 

                    Tricyclics           Negative NA           Not Detected     

   



    



                                        Radiology Report from  [id number] on  22:42:00        

 

                                        Reason For Examseizure; went to groundRE

PORTPROCEDURE: CT head and CT cervical 

spine without contrast.TECHNIQUE: Multiple contiguous axial images were obtained
through the brain andcervical spine without the use of intravenous contrast. 
Sagittal and coronalreformations through the cervical spine were then performed.
All CT scans useone or more of the following dose optimizing techniques: 
automated exposurecontrol, MA and/or KvP adjustment based on a patient size and 
exam type, oriterative reconstruction.INDICATION:  Seizure, fall to 
groundCOMPARISON: MRI brain from 2012FINDINGS:CT HEAD: Ventricles and 
cortical sulci are age-appropriate. There is no midlineshift or mass effect. No 
acute intracranial hemorrhage is seen. There is no CTevidence of acute 
territorial ischemia. The calvarium appears intact.CT cervical spine: Alignment 
of the cervical spine is normal. No bony fragmentsor hyperdense fluid 
collections are seen in the spinal canal. Vertebral bodyheights and disc heights
are preserved. No acute fracture is seen.IMPRESSION:1. No calvarium fracture or 
acute intracranial hemorrhage.2. No fracture seen in the cervical spine.Dictated
on workstation:[id number] Line***** FINAL *****DICTATED BY:    
MARIANO WHITAKER MDDICTATED DT/TM: 2019 7:40 PMSIGNED BY:  MARIANO WHITAKER MDSIGNED (ELECTRONIC SIGNATURE):  2019 10:15 PMTECHNOLOGIST:  
BLANQUITA MONTEMAYOR RT(R)        



                                      



Encounters

      



                ACCT No.           Visit Date/Time           Discharge          

 Status         

             Pt. Type           Provider           Facility           Loc./Unit 

          

Complaint        

 

                [id number]           2019 08:00:00           2019 13:0

0:00           

DIS           Outpatient                                                        

   

 

                [id number]           2020 09:40:00           2020 23:59:

59           CLS

                Outpatient           AGUSTIN TOURE LAC                          

 [facility]                                   

 

             [id number]           10/21/2016 11:54:32                               

      Document 

Registration                                                                    

 

             [id number]           2015 06:58:11                        ACT   

        

Outpatient           Emmanuelle Moon [facility]           1       

                                                 

 

                    [id number]           2019 15:05:34           0

2019 15:57:08  

                DIS             Outpatient           CHRISTIAN [id number]           2019 14:55:23           0

3/26/2019 15:28:06  

                DIS             Outpatient           CHRISTIAN [id number]           10/08/2018 15:37:07           1

 16:44:10  

             DIS           Outpatient           SAGE [id number]           2018 15:26:44           0

2018 16:40:58  

             DIS           Outpatient           SAGE [id number]           2018 15:22:12           0

2018 16:22:38  

             DIS           Outpatient           SAGE [id number]           2017 15:22:03           0

2017 16:17:17  

                DIS             Outpatient           PATRICIA OGDEN N.P. 

          

                                                             

 

             [id number]           2019 18:12:00                         

            

Document Registration                                                           

         

 

             [id number]           08/10/2020 00:02:00                        A

CT           

Emergency                 DARWIN AYALA, HEBER PERES           [facility]                 ER                        ABD PAIN        

 

             [id number]           10/05/2019 18:49:56                        ACT   

        

Document Registration                                                           

         

 

             [id number]           10/10/2019 07:45:00                              

       Document

Registration                                                                    

 

             [id number]           10/10/2019 00:00:00                              

       Document

Registration                                                                    

 

             [id number]           2019 00:00:00                              

       Document

Registration                                                                    

 

             [id number]           2018 00:00:00                              

       Document

Registration                                                                    

 

             [id number]           2018 15:15:00                              

       Document

Registration                                                                    

 

             [id number]           2018 00:00:00                              

       Document

Registration                                                                    

 

             [id number]           2018 00:00:00                              

       Document

Registration                                                                    

 

             [id number]           2017 00:00:00                              

       Document

Registration                                                                    

 

             [id number]           2017 00:00:00                              

       Document

Registration                                                                    

 

             [id number]           2017 07:15:00                              

       Document

Registration                                                                    

 

             [id number]           2017 00:00:00                              

       Document

Registration                                                                    

 

             [id number]           2016 14:05:10                              

       Document

Registration                                                                    

 

             [id number]           2016 00:00:00                              

       Document

Registration                                                                    

 

                    [id number]           2019 18:12:00           

019 21:42:00        

                DIS             Emergency           Goins Denise Vi

[facility] [address]           [facility] ED                   POSS seizure        

 

             [id number]           2019 05:18:02                       

              

Document Registration

## 2020-08-10 NOTE — XMS REPORT
Summary of Care

                             Created on: 04/15/2015



Farrukh Laguna

External Reference #: [id number]

: 2010

Sex: Male



Demographics





                          Address                   [place]  [address]

 

                          Preferred Language        English

 

                          Marital Status            Unknown

 

                          Restorationism Affiliation     Unknown

 

                          Race                      Other Race

 

                          Ethnic Group              Unknown





Author





                          Author                    Farrukh Velazco M.D.

 

                          Organization              Unknown

 

                          Address                   [address]

[place], [place]  [id number]



 

                          Phone                     Unavailable







Care Team Providers





                    Care Team Member Name Role                Phone

 

                    Heather Velazco M.D. Unavailable         Unavailable

 

                    Heather Velazco      PP                  Unavailable

 

                          Unavailable               Unavailable







Functional Status

Functional Status Health Issues* 



                    Name                Dates               Details

 

                                        Functional status health issues are not 

documented

                                                    Status: 





Cognitive Status Health Issues* 



                    Name                Dates               Details

 

                                        Cognitive status health issues are not d

ocumented

                                                    Status: 









Problems





                    Name                Dates               Details

 

                                        Well child visit (V20.2, Z00.129) 

                                                    Status: Active

 

                                        Encounter for removal of sutures (V58.32

, Z48.02) 

                                                    Status: Active

 

                                        Laceration of head (873.8, S01.91XA) 

                                                    Status: Active

 

                                        Acute bronchitis (466.0, J20.9) 

                                                    Status: Active

 

                                        Allergic rhinitis (477.9, J30.9) 

                                                    Status: Active

 

                                        Cough (786.2, R05) 

                                                    Status: Active

 

                                        Asthma (493.90, J45.909) 

                                                    Status: Active







Medications





                    Name                Dates               Details

 

                                        Albuterol Sulfate (2.5 MG/3ML) 0.083% In

halation Nebulization Solution

USE 1 UNIT DOSE Every 4 hours as needed for cough/wheezing



 

                                         Quantity: 1









 Heather Velazco M.D.* 



                                         Started 10-Apr-2013





Active3 ML Plas Cont (60 Plas Conts)

Montelukast Sodium 4 MG Oral Tablet Chewable

CHEW AND SWALLOW 1 TABLET DAILY.

* 



                           Quantity: 30              Refills: 3









 Heather Velazco M.D.* 



                                         Started 14-Mar-2014





ActiveAzithromycin 200 MG/5ML Oral Suspension Reconstituted

Give 4.25 today, then give 2.25 daily x 4 days

* 



                           Quantity: 1               Refills: 0









 Heather Velazco M.D.* 



                                         Started 15-Apr-2015





Xyxlgy10 ML Bottle





Allergies and Adverse Reactions





                    Name                Dates               Details

 

                    No Known Drug Allergies                     Status: Active







Past Medical History





                    Name                Dates               Details

 

                                        Encounter for removal of sutures (V58.32

, Z48.02) 

                                                    Status: Active







Procedures





                    Procedure           Dates               Details

 

                    Procedures not documented                      







Immunization





                    Name                Dates               Details

 

                                        Immunizations not documented

                                                     







Social History

Smoking Status* Unknown if ever smoked





Vital Signs





                Date            Test            Result          Details

 

                                                                 

 

                                        15-Apr-2015 14:38

                    Heart Rate          100 /min            Status: 



 

                    Temperature         98.1 f              Status: 



 

                    Weight              39 lb               Status: 



 

                    O2 SAT              97 %                Status: 









Results





                Date            Description     Value           Details

 

                    15-Apr-2015 15:13   Pertussis PCR 5800     

 

                                PERTUSSIS PCR   Microbiology results  (Better)  

Comments: NOTE:*Sample has been 

forwarded to the [facility] for testing.  Please
refer to [facility] report for final results.*-----









Plan of Care

Planned Observations* 



                    Name                Dates               Details

 

                    Planned Goals not documented                     Goal









Instructions

* Instructions not documented





Encounters





                                        Appointment; Heather Velazco 

Encounter Diagnosis: Problem not documented On 15-Apr-2015

                                        14:30

 

                                        Appointment; Narda Bhatti 

Encounter Diagnosis: Problem not documented On 7-May-2014

                                        14:15

 

                                        Appointment; Aaron Tavarez 

Encounter Diagnosis: Problem not documented On 14-Mar-2014

                                        15:00

 

                                        Appointment; Heather Velazco 

Encounter Diagnosis: Problem not documented On 

                                        10:45

## 2020-08-10 NOTE — XMS REPORT
Summary of Care

                             Created on: 2015



Farrukh Laguna

External Reference #: [id number]

: 2010

Sex: Male



Demographics





                          [place]  [address]

 

                          Preferred Language        English

 

                          Marital Status            Unknown

 

                          Pentecostal Affiliation     Unknown

 

                          Race                      Other Race

 

                          Ethnic Group              Unknown





Author





                          Author                    Farrukh Busby

 

                          Organization              Unknown

 

                          Address                   [address] [place]  [address]



 

                          Phone                     Unavailable







Care Team Providers





                    Care Team Member Name Role                Phone

 

                    Heather Velazco M.D. Unavailable         Unavailable

 

                    Emmanuelle Busby   Unavailable         Unavailable

 

                    Heather Velazco PP                  Unavailable

 

                          Unavailable               Unavailable







Functional Status

Functional Status Health Issues* 



                    Name                Dates               Details

 

                                        Functional status health issues are not 

documented

                                                    Status: 





Cognitive Status Health Issues* 



                    Name                Dates               Details

 

                                        Cognitive status health issues are not d

ocumented

                                                    Status: 









Problems





                    Name                Dates               Details

 

                                        Allergic rhinitis (477.9, J30.9) 

                                                    Status: Active

 

                                        Cough (786.2, R05) 

                                                    Status: Active

 

                                        History of Encounter for removal of sutu

res (V58.32, Z48.02) 

                                                    Status: Resolved

 

                                        Asthma (493.90, J45.909) 

                                                    Status: Active

 

                                        Otitis media (382.9, H66.90) 

                                                    Status: Active







Medications





                    Name                Dates               Details

 

                                        Albuterol Sulfate (2.5 MG/3ML) 0.083% In

halation Nebulization Solution

USE 1 UNIT DOSE Every 4 hours as needed for cough/wheezing



 

                                         Quantity: 1









 Emmanuelle Moon* 



                                         Started 10-Apr-2013





Active3 ML Plas Cont (60 Plas Conts)

Montelukast Sodium 4 MG Oral Tablet Chewable

CHEW AND SWALLOW 1 TABLET DAILY AT BEDTIME

* 



                           Quantity: 30              Refills: 3









 Emmanuelle Moon* 



                                         Started 14-Mar-2014





ActiveVentolin  (90 Base) MCG/ACT Inhalation Aerosol Solution

2 puffs 60 seconds apart PRN cough or wheezing

* 



                           Quantity: 1               Refills: 0









 Heather Velazco M.D.* 



                                         Started 4-Dec-2015





Active8 GM Inhaler

AeroChamber Plus Miscellaneous

Generic aerochamber for use with inhaler

* 



                           Quantity: 1               Refills: 0









 Heather Velazco M.D.* 



                                         Started 4-Dec-2015





Active



Allergies and Adverse Reactions





                    Name                Dates               Details

 

                    No Known Drug Allergies                     Status: Active







Past Medical History





                    Name                Dates               Details

 

                                        History of acute bronchitis (V12.69, Z87

.09) 

                                                    Status: Resolved

 

                                        History of Encounter for removal of sutu

res (V58.32, Z48.02) 

                                                    Status: Resolved

 

                                        History of Laceration of head (873.8, S0

1.91XA) 

                                                    Status: Resolved

 

                                        History of Well child visit (V20.2, Z00.

129) 

                                                    Status: Resolved







Procedures





                    Procedure           Dates               Details

 

                    Procedures not documented                      







Immunization





                    Name                Dates               Details

 

                                        DTaP

                          Administered on:2010  

 

                                        IPV

                          Administered on:2010  

 

                                        Hepatitis B

                          Administered on:2010  

 

                                        HIB

                          Administered on:2010  

 

                                        Prevnar 13 Intramuscular Suspension

                          Administered on:2010  

 

                                        DTaP

                          Administered on:24-May-2011  

 

                                        IPV

                          Administered on:24-May-2011  

 

                                        Hepatitis B

                          Administered on:24-May-2011  

 

                                        HIB

                          Administered on:24-May-2011  

 

                                        Prevnar 13 Intramuscular Suspension

                          Administered on:24-May-2011  

 

                                        DTaP

                          Administered on:15-Aug-2011  

 

                                        Hepatitis B

                          Administered on:15-Aug-2011  

 

                                        Prevnar 13 Intramuscular Suspension

                          Administered on:15-Aug-2011  

 

                                        DTaP

                          Administered on:27-Oct-2011  

 

                                        IPV

                          Administered on:27-Oct-2011  

 

                                        MMR

                          Administered on:27-Oct-2011  

 

                                        Hepatitis A

                          Administered on:27-Oct-2011  

 

                                        Hepatitis B

                          Administered on:27-Oct-2011  

 

                                        HIB

                          Administered on:27-Oct-2011  

 

                                        Prevnar 13 Intramuscular Suspension

                          Administered on:27-Oct-2011  

 

                                        Varicella

                          Administered on:27-Oct-2011  

 

                                        Influenza

                          Administered on:27-Oct-2011  

 

                                        DTaP

                          Administered on:2012  

 

                                        Hepatitis A

                          Administered on:2012  







Social History

Smoking Status* Unknown if ever smoked





Vital Signs





                Date            Test            Result          Details

 

                                                                 

 

                                        4-Dec-2015 14:58

                    BP Systolic         110 mm[Hg]          Status: 



 

                    BP Diastolic        50 mm[Hg]           Status: 



 

                    Heart Rate          88 /min             Status: 



 

                    Height              41.25 in            Status: 



 

                    Weight              43 lb               Status: 



 

                    Body Mass Index Calculated 17.77 kg/m2         Status: 



 

                    Body Surface Area Calculated 0.74 m2             Status: 









Results





                Date            Description     Value           Details

 

                    Results not documented                      

 

                                                                 







Plan of Care

Planned Observations* 



                    Name                Dates               Details

 

                    Planned Goals not documented                     Goal









Instructions

* Instructions not documented





Encounters





                                        Appointment; Emmanuelle Moon 

Encounter Diagnosis: Problem not documented On 4-Dec-2015

                                        14:45

 

                                        Appointment; Heather Velazco 

Encounter Diagnosis: Problem not documented On 15-Apr-2015

                                        14:30

 

                                        Appointment; Narda Bhatti 

Encounter Diagnosis: Problem not documented On 7-May-2014

                                        14:15

 

                                        Appointment; Tavarez, Aaron 

Encounter Diagnosis: Problem not documented On 14-Mar-2014

                                        15:00

## 2020-08-10 NOTE — ED ABDOMINAL PAIN
General


Chief Complaint:  Abdominal/GI Problems


Stated Complaint:  ABD PAIN


Nursing Triage Note:  


woke up screaming in pain per 


Source of Information:  Patient, Caregiver (foster father)


Exam Limitations:  No Limitations





History of Present Illness


Date Seen by Provider:  Aug 10, 2020


Time Seen by Provider:  00:09


Initial Comments


The patient arrives to the ER by private conveyance with chief complaint that he

began to experience abdominal pain approximately 20-30 minutes prior to arrival.

The foster mom try to get him to Tums which did not help the pain and they 

decided he should probably be checked out. He is needed at her home and they're 

unaware he's had no surgeries. No known medical history except for asthma. Does 

not take any medicines routinely. No fevers chills vomiting. No nausea. Child 

and family are unsure the last time he's had a bowel movement. Unknown family 

history. He walked in splinting his abdomen and foster dad says he was 

whimpering and in pain on the whole car ride over intermittently.





Allergies and Home Medications


Allergies


Coded Allergies:  


     No Known Drug Allergies (Unverified , 8/10/20)





Home Medications


Polyethylene Glycol 3350 119 Gm Powder, 17 GM PO TID PRN for CONSTIPATION-1ST 

LINE


   Prescribed by: HEBER GRANADOS on 8/10/20 0121


Sod Phosphate/Sod Biphosphate 1 Ea Enem, 1 EA RC DAILY


   Prescribed by: HEBER GRANADOS on 8/10/20 0121





Patient Home Medication List


Home Medication List Reviewed:  Yes





Review of Systems


Review of Systems


Constitutional:  No chills, No diaphoresis


EENTM:  No Blurred Vision, No Double Vision


Respiratory:  Denies Cough, Denies Shortness of Air


Cardiovascular:  Denies Chest Pain, Denies Lightheadedness


Gastrointestinal:  See HPI; Denies Abdomen Distended; Abdominal Pain; Denies 

Diarrhea, Denies Nausea


Genitourinary:  Denies Burning, Denies Discharge


Musculoskeletal:  No back pain, No joint pain





All Other Systems Reviewed


Negative Unless Noted:  Yes





Past Medical-Social-Family Hx


Patient Social History


Alcohol Use:  Denies Use


Recreational Drug Use:  No


Smoking Status:  Never a Smoker


2nd Hand Smoke Exposure:  No


Recent Foreign Travel:  No


Contact w/Someone Who Travel:  No


Recent Infectious Disease Expo:  No


Recent Hopitalizations:  No


Ebola Symptoms:  Denies Symptoms Listed


Physical Abuse:  No


Sexual Abuse:  No


Mistreated:  No


Fear:  No





Immunizations Up To Date


Tetanus Booster (TDap):  Less than 5yrs


PED Vaccines UTD:  Yes





Seasonal Allergies


Seasonal Allergies:  No





Past Medical History


Surgeries:  No


Respiratory:  No


Cardiac:  No


Neurological:  No


Genitourinary:  No


Gastrointestinal:  No


Musculoskeletal:  No


Endocrine:  No


HEENT:  No


Cancer:  No


Psychosocial:  No


Integumentary:  No


Blood Disorders:  No





Physical Exam


Vital Signs





Vital Signs - First Documented








 8/10/20





 00:11


 


Temp 36.4


 


Pulse 86


 


Resp 24


 


B/P (MAP) 143/103


 


O2 Delivery Room Air





Capillary Refill :


Height/Weight/BMI


Height: '"


Weight: lbs. oz. kg;  BMI


Method:


General Appearance:  WD/WN, no apparent distress


HEENT:  PERRL/EOMI, normal ENT inspection, pharynx normal


Neck:  full range of motion, supple, normal inspection


Respiratory:  lungs clear, normal breath sounds, no respiratory distress, no 

accessory muscle use


Cardiovascular:  normal peripheral pulses, regular rate, rhythm


Peripheral Pulses:  2+ Radial Pulses (R), 2+ Radial Pulses (L)


Gastrointestinal:  normal bowel sounds, no organomegaly, guarding, rebound, 

tenderness (right upper and lower quadrant with McBurney's point tenderness and 

rebound tenderness. Negative for Rovsing or psoas sign)


Extremities:  normal range of motion, normal inspection, normal capillary refill


Back:  normal inspection, no vertebral tenderness, CVA tenderness (L)


Pelvic:  normal external exam


Neurologic/Psychiatric:  alert, oriented x 3, other (Tearful, whimpering, 

cooperative)


Skin:  normal color, warm/dry





Progress/Results/Core Measures


Results/Orders


Lab Results





Laboratory Tests








Test


 8/10/20


00:25 8/10/20


01:00 Range/Units


 


 


White Blood Count


 8.9 


 


 4.3-11.0


10^3/uL


 


Red Blood Count


 4.75 


 


 4.20-5.25


10^6/uL


 


Hemoglobin 14.0   10.9-15.8  G/DL


 


Hematocrit 38   32-48  %


 


Mean Corpuscular Volume 80   75-91  FL


 


Mean Corpuscular Hemoglobin 30   25-34  PG


 


Mean Corpuscular Hemoglobin


Concent 37 H


 


 32-36  G/DL





 


Red Cell Distribution Width 13.3   10.0-14.5  %


 


Platelet Count


 270 


 


 130-400


10^3/uL


 


Mean Platelet Volume 10.8 H  7.4-10.4  FL


 


Neutrophils (%) (Auto) 55   42-75  %


 


Lymphocytes (%) (Auto) 34   12-44  %


 


Monocytes (%) (Auto) 6   0-12  %


 


Eosinophils (%) (Auto) 5   0-10  %


 


Basophils (%) (Auto) 1   0-10  %


 


Neutrophils # (Auto) 4.9   1.8-8.0  X 10^3


 


Lymphocytes # (Auto) 3.0   1.5-6.5  X 10^3


 


Monocytes # (Auto) 0.5   0.0-1.0  X 10^3


 


Eosinophils # (Auto)


 0.4 H


 


 0.0-0.3


10^3/uL


 


Basophils # (Auto)


 0.0 


 


 0.0-0.1


10^3/uL


 


Sodium Level 137   135-145  MMOL/L


 


Potassium Level 3.8   3.6-5.0  MMOL/L


 


Chloride Level 107     MMOL/L


 


Carbon Dioxide Level 23   21-32  MMOL/L


 


Anion Gap 7   5-14  MMOL/L


 


Blood Urea Nitrogen 19 H  7-18  MG/DL


 


Creatinine


 0.66 


 


 0.60-1.30


MG/DL


 


BUN/Creatinine Ratio 29    


 


Glucose Level 112 H    MG/DL


 


Calcium Level 9.5   8.5-10.1  MG/DL


 


Corrected Calcium 9.3   8.5-10.1  MG/DL


 


Total Bilirubin 0.3   0.1-1.0  MG/DL


 


Aspartate Amino Transf


(AST/SGOT) 27 


 


 5-34  U/L





 


Alanine Aminotransferase


(ALT/SGPT) 18 


 


 0-55  U/L





 


Alkaline Phosphatase 144     U/L


 


C-Reactive Protein High


Sensitivity 0.17 


 


 0.00-0.50


MG/DL


 


Total Protein 6.8   6.4-8.2  GM/DL


 


Albumin 4.2   3.2-4.5  GM/DL


 


Lipase 15   8-78  U/L


 


Urine Color  YELLOW   


 


Urine Clarity  CLEAR   


 


Urine pH  6.5  5-9  


 


Urine Specific Gravity  1.020  1.016-1.022  


 


Urine Protein  NEGATIVE  NEGATIVE  


 


Urine Glucose (UA)  NEGATIVE  NEGATIVE  


 


Urine Ketones  NEGATIVE  NEGATIVE  


 


Urine Nitrite  NEGATIVE  NEGATIVE  


 


Urine Bilirubin  NEGATIVE  NEGATIVE  


 


Urine Urobilinogen  0.2  < = 1.0  MG/DL


 


Urine Leukocyte Esterase  NEGATIVE  NEGATIVE  


 


Urine RBC (Auto)  NEGATIVE  NEGATIVE  


 


Urine RBC  NONE   /HPF


 


Urine WBC  NONE   /HPF


 


Urine Squamous Epithelial


Cells 


 RARE 


  /HPF





 


Urine Crystals  NONE   /LPF


 


Urine Bacteria  NEGATIVE   /HPF


 


Urine Casts  NONE   /LPF


 


Urine Mucus  NEGATIVE   /LPF


 


Urine Culture Indicated  NO   








My Orders





Orders - HEBER GRANADOS


Ua Culture If Indicated (8/10/20 00:04)


Cbc With Automated Diff (8/10/20 00:15)


Hs C Reactive Protein (8/10/20 00:15)


Comprehensive Metabolic Panel (8/10/20 00:15)


Lipase (8/10/20 00:15)


Ed Iv/Invasive Line Start (8/10/20 00:18)


Ns Iv 500 Ml (Sodium Chloride 0.9%) (8/10/20 00:18)


Ketorolac Injection (Toradol Injection) (8/10/20 00:30)





Medications Given in ED





Current Medications








 Medications  Dose


 Ordered  Sig/Mario


 Route  Start Time


 Stop Time Status Last Admin


Dose Admin


 


 Ketorolac


 Tromethamine  15 mg  ONCE  ONCE


 IVP  8/10/20 00:30


 8/10/20 00:31 DC 8/10/20 00:28


15 MG


 


 Sodium Chloride  500 ml @ 0


 mls/hr  Q0M ONCE


 IV  8/10/20 00:18


 8/10/20 00:21 DC 8/10/20 00:27


0 MLS/HR








Vital Signs/I&O











 8/10/20





 00:11


 


Temp 36.4


 


Pulse 86


 


Resp 24


 


B/P (MAP) 143/103


 


O2 Delivery Room Air











Progress


Progress Note #1:  


   Time:  00:28


Progress Note


Aseptic vital signs with an acute abdominal exam. Differential includes 

constipation, appendicitis, Meckel's diverticulum, GERD/gastritis, biliary tree,

pyelonephritis/UTI, etc. Plan to give him NSAIDs, IV fluids and check some labs 

and urine.


Progress Note #2:  


   Time:  01:15


Progress Note


Patient is 100% improved. He is no longer in any discomfort. His labs and 

urinalysis are unremarkable. His repeat examination is unremarkable.  He is able

to jump up to the side of the bed without difficulty or mesenteric signs. We are

going to recommend he go home and try MiraLAX/enemas.





Departure


Impression





   Primary Impression:  


   Constipation


   Qualified Codes:  K59.00 - Constipation, unspecified


Disposition:  01 HOME, SELF-CARE


Condition:  Stable





Departure-Patient Inst.


Decision time for Depature:  01:15


Referrals:  


[facility] (PCP/Family)


Primary Care Physician


Patient Instructions:  Constipation, Child (DC)





Add. Discharge Instructions:  


In the morning drink lots of fluids.


Start taking a regimen of 1 capful of MiraLAX in 6-8 ounces of fluid 2-3 times a

day until you have good results.


You may apply one pediatric enema per rectum daily for the next 1-2 days to help

move things along.


Stool softener such as Colace may be beneficial for maintenance therapy against 

constipation.


If he has pain you may use Tylenol 325 mg every 6 hours as needed for pain.


You may also use ibuprofen 400 mg every 6 hours as needed for pain.


Heat applied to the abdomen may help for cramps as well.


If he develops fevers, intractable vomiting, pain or other worrisome symptoms 

then you may return to the doctor's office or emergency room as appropriate.


All discharge instructions reviewed with patient and/or family. Voiced 

understanding.


Scripts


Sod Phosphate/Sod Biphosphate (Fleet Pedia-Lax Enema) 1 Ea Enem


1 EA RC DAILY for 2 Days, #2 EA 0 Refills


   Prov: HEBER GRANADOS         8/10/20 


Polyethylene Glycol 3350 (Miralax) 119 Gm Powder


17 GM PO TID PRN for CONSTIPATION-1ST LINE, #1 EA 0 Refills


   Prov: HEBER GRANADOS         8/10/20











HEBER GRANADOS                 Aug 10, 2020 00:28

## 2020-08-10 NOTE — XMS REPORT
Summary of Care

                             Created on: 2015



Farrukh Laguna

External Reference #: [id number]

: 2010

Sex: Male



Demographics





                          Address                   [place]  [address]

 

                          Preferred Language        English

 

                          Marital Status            Unknown

 

                          Muslim Affiliation     Unknown

 

                          Race                      Other Race

 

                          Ethnic Group              Unknown





Author





                          Author                    Farrukh Velazco M.D.

 

                          Organization              Unknown

 

                          Address                   [address] [place], [place]  [id number]



 

                          Phone                     Unavailable







Care Team Providers





                    Care Team Member Name Role                Phone

 

                    Heather Velazco M.D. Unavailable         Unavailable

 

                    Heather Velazco      PP                  Unavailable

 

                          Unavailable               Unavailable







Functional Status

Functional Status Health Issues* 



                    Name                Dates               Details

 

                                        Functional status health issues are not 

documented

                                                    Status: 





Cognitive Status Health Issues* 



                    Name                Dates               Details

 

                                        Cognitive status health issues are not d

ocumented

                                                    Status: 









Problems





                    Name                Dates               Details

 

                                        Allergic rhinitis (477.9, J30.9) 

                                                    Status: Active

 

                                        Cough (786.2, R05) 

                                                    Status: Active

 

                                        History of Encounter for removal of sutu

res (V58.32, Z48.02) 

                                                    Status: Resolved

 

                                        Asthma (493.90, J45.909) 

                                                    Status: Active

 

                                        Otitis media (382.9, H66.90) 

                                                    Status: Active







Medications





                    Name                Dates               Details

 

                                        Albuterol Sulfate (2.5 MG/3ML) 0.083% In

halation Nebulization Solution

USE 1 UNIT DOSE Every 4 hours as needed for cough/wheezing



 

                                         Quantity: 1









 Heather Vealzco M.D.* 



                                         Started 10-Apr-2013





Active3 ML Plas Cont (60 Plas Conts)

Montelukast Sodium 4 MG Oral Tablet Chewable

CHEW AND SWALLOW 1 TABLET DAILY.

* 



                           Quantity: 30              Refills: 3









 Heather Velazco M.D.* 



                                         Started 14-Mar-2014





ActiveAzithromycin 200 MG/5ML Oral Suspension Reconstituted

Give 4.25 today, then give 2.25 daily x 4 days

* 



                           Quantity: 1               Refills: 0









 Heather Velazco M.D.* 



                                         Started 15-Apr-2015





[id number] ML Bottle





Allergies and Adverse Reactions





                    Name                Dates               Details

 

                    No Known Drug Allergies                     Status: Active







Past Medical History





                    Name                Dates               Details

 

                                        History of acute bronchitis (V12.69, Z87

.09) 

                                                    Status: Resolved

 

                                        History of Encounter for removal of sutu

res (V58.32, Z48.02) 

                                                    Status: Resolved

 

                                        History of Laceration of head (873.8, S0

1.91XA) 

                                                    Status: Resolved

 

                                        History of Well child visit (V20.2, Z00.

129) 

                                                    Status: Resolved







Procedures





                    Procedure           Dates               Details

 

                    Procedures not documented                      







Immunization





                    Name                Dates               Details

 

                                        Immunizations not documented

                                                     







Social History

Smoking Status* Unknown if ever smoked





Vital Signs





                Date            Test            Result          Details

 

                                                                 

 

                                        15-Apr-2015 14:38

                    Heart Rate          100 /min            Status: 



 

                    Temperature         98.1 f              Status: 



 

                    Weight              39 lb               Status: 



 

                    O2 SAT              97 %                Status: 









Results





                Date            Description     Value           Details

 

                    15-Apr-2015 15:13   Pertussis PCR 5800     

 

                                PERTUSSIS PCR   Microbiology results  (Better)  

Comments: NOTE:*Sample has been 

forwarded to the [facility] for testing.  Please
refer to EL report for final results.*-----









Plan of Care

Planned Observations* 



                    Name                Dates               Details

 

                    Planned Goals not documented                     Goal









Instructions

* Instructions not documented





Encounters





                                        Appointment; Heather Velazco 

Encounter Diagnosis: Problem not documented On 15-Apr-2015

                                        14:30

 

                                        Appointment; Narda Bhatti 

Encounter Diagnosis: Problem not documented On 7-May-2014

                                        14:15

 

                                        Appointment; Aaron Tavarez 

Encounter Diagnosis: Problem not documented On 14-Mar-2014

                                        15:00

 

                                        Appointment; Heather Velazco 

Encounter Diagnosis: Problem not documented On 

                                        10:45

## 2020-08-10 NOTE — XMS REPORT
Transfer Level of Care 2014 10:28

                             Created on: 2014



DINORAH PUENTES

External Reference #: [id number]

: 2010

Sex: Male



Demographics





                          Address                   [address] [place]  [address]

 

                          Home Phone                [phone]

 

                          Preferred Language        English

 

                          Marital Status            Unknown

 

                          Confucianism Affiliation     Unknown

 

                          Race                       or [place] Na

tive

 

                          Ethnic Group              Unknown





Author





                          Author                    [facility]

 

                          Organization              Unknown

 

                          Address                   Unknown

 

                          Phone                     Unavailable







Care Team Providers





                    Care Team Member Name Role                Phone

 

                    MD CHELSEA,  [place]                  Unavailable







Reason For Visit







Chief Complaint

FEELS BAD



Social History







Functional Status







Vital Signs







Results





Chemistry from 2014 12:40 PMSODIUM 136 MMOL/L (136-145 MMOL/L)

POTASSIUM 4.2 MMOL/L (3.5-5.1 MMOL/L)

CHLORIDE 100 MMOL/L ( MMOL/L)

TCO2 27.8 MMOL/L (21.0-32.0 MMOL/L)

ANION GAP 8.2 MMOL/L (8.0-16.0 MMOL/L)

BUN 15 MG/DL (7-18 MG/DL)

CREATININE 0.29 MG/DL L (0.63-1.13 MG/DL)

BUN/CREATININE RATIO 51.7  H (9.1-17.0 )

GLUCOSE 98 MG/DL (65-99 MG/DL)

CALCIUM 9.4 MG/DL (8.5-10.1 MG/DL)

BILIRUBIN TOTAL 0.29 MG/DL (0.20-1.00 MG/DL)

TOTAL PROTEIN 6.7 GM/DL (6.4-8.2 GM/DL)

ALBUMIN 4.1 GM/DL (3.4-5.0 GM/DL)

GLOBULIN 2.6 GM/DL (2.3-3.5 GM/DL)

A/G RATIO 1.6 MG/DL (1.5-2.2 MG/DL)

ALK PHOS 164 U/L H ( U/L)

ALT (SGPT) 29 U/L (12-78 U/L)

AST (SGOT) 34 U/L (15-37 U/L)



Hematology from 2014 12:40 PMWBC 6.1 X10e3/UL (4.5-13.5 X10e3/UL)

RBC 4.54 X10e6/UL (4.06-5.63 X10e6/UL)

HEMOGLOBIN 13.1 G/DL (12.5-16.3 G/DL)

HEMATOCRIT 37.5 % (36.7-47.1 %)

MCV 82.6 FL (80.0-100.0 FL)

MCH 28.9 PG (27.0-33.0 PG)

MCHC 35.0 G/DL (32.0-36.0 G/DL)

RDW 13.8 % (12.3-17.0 %)

RDWSD 39.8  (37.1-47.8 )

PLATELET 248 X10e3/UL (159-386 X10e3/UL)

MPV 8.5 FL (7.4-10.4 FL)

AUTOMATED DIFF PERFORMED 

SEGS 41.0 %

LYMPHOCYTES 47.8 %

MONOCYTES 4.9 %

EOSINOPHILS 5.1 %

BASOPHILS 1.2 %

ABSOLUTE NEUTROPHILS 2.5 X10e3/UL (1.8-8.0 X10e3/UL)

ABSOLUTE LYMPHOCYTES 2.9 X10e3/UL (1.5-6.5 X10e3/UL)

ABSOLUTE MONOCYTES 0.3 X10e3/UL (0.3-1.0 X10e3/UL)

ABSOLUTE EOSINOPHILS 0.3 X10e3/UL (0.0-0.5 X10e3/UL)

ABSOLUTE BASOPHILS 0.1 X10e3/UL (0.0-0.2 X10e3/UL)



Urinalysis from 2014 1:05 PMURINE COLOR YELLOW  (STRAW/YELL/DK YELL )

URINE APPEARANCE CLEAR  (CLEAR )

URINE PH 7.0  (5.0-8.0 )

URINE SPECIFIC GRAVITY 1.020  (<=1.005->=1.030 )

URINE GLUCOSE NEGATIVE MG/DL (NEGATIVE MG/DL)

URINE BILIRUBIN NEGATIVE  (NEGATIVE )

URINE KETONES NEGATIVE MG/DL (NEGATIVE MG/DL)

URINE BLOOD NEGATIVE  (NEGATIVE )

URINE PROTEIN NEGATIVE MG/DL (NEGATIVE MG/DL)

URINE UROBILINOGEN 0.2 EU/DL (0.2-1.0 EU/DL)

URINE NITRITES NEGATIVE  (NEGATIVE )

*URINE LEUKOCYTES NEGATIVE  (NEGATIVE )



Coagulation from 2014 12:40 PMPROTHROMBIN TIME 11.4 SECONDS (9.4-11.5 
SECONDS)

INR 1.1 

PARTIAL THROMBOPLASTIN TIME 25.6 SECONDS (22.0-28.0 SECONDS)



CT Scan from 2014 12:29 PMCT CEREBRAL W/O CONTRAST DATE OF EXAM:  Aug 27 
2014 12:51PM

Proc: CT  0001 - CT CEREBRAL W/O CONTRAST        CPT Code(s): 83976-[id number] ; ;

INDICATION / CLINICAL HISTORY:  Syncope

FINDINGS: The ventricles and sulci are normal in size and configuration for

the patient's age.  There is no evidence of acute intracranial hemorrhage or

mass effect.  The basilar cisterns are patent.  The calvarium is intact.  The

visualized paranasal sinuses and mastoid air cells are clear.

IMPRESSION: No acute intracranial abnormality.



Problems

Encounter Diagnosis





No relevant problems exist.



Encounters

Encounter Diagnosis

No relevant problems exist.



Plan of Care







Procedures





No relevant procedures performed.



Immunizations





No immunizations administered or ordered.



Hospital Course





Hospital Discharge Instructions







Allergies, Adverse Reactions, Alerts

* Latex Allergy has not been assessed.

* IV Contrast Allergy has not been assessed.





Medication





Medication reconciliation has not been performed.